# Patient Record
Sex: FEMALE | Race: WHITE | Employment: OTHER | URBAN - METROPOLITAN AREA
[De-identification: names, ages, dates, MRNs, and addresses within clinical notes are randomized per-mention and may not be internally consistent; named-entity substitution may affect disease eponyms.]

---

## 2019-06-10 ENCOUNTER — DOCUMENTATION ONLY (OUTPATIENT)
Dept: OTHER | Facility: CLINIC | Age: 84
End: 2019-06-10

## 2019-06-18 ENCOUNTER — ASSISTED LIVING VISIT (OUTPATIENT)
Dept: GERIATRICS | Facility: CLINIC | Age: 84
End: 2019-06-18
Payer: COMMERCIAL

## 2019-06-18 VITALS
BODY MASS INDEX: 19.93 KG/M2 | WEIGHT: 124 LBS | SYSTOLIC BLOOD PRESSURE: 100 MMHG | DIASTOLIC BLOOD PRESSURE: 60 MMHG | TEMPERATURE: 97.3 F | RESPIRATION RATE: 20 BRPM | OXYGEN SATURATION: 98 % | HEIGHT: 66 IN | HEART RATE: 68 BPM

## 2019-06-18 DIAGNOSIS — F32.1 CURRENT MODERATE EPISODE OF MAJOR DEPRESSIVE DISORDER, UNSPECIFIED WHETHER RECURRENT (H): ICD-10-CM

## 2019-06-18 DIAGNOSIS — R52 PAIN: ICD-10-CM

## 2019-06-18 DIAGNOSIS — I69.254: Primary | ICD-10-CM

## 2019-06-18 DIAGNOSIS — Z71.89 ACP (ADVANCE CARE PLANNING): ICD-10-CM

## 2019-06-18 DIAGNOSIS — K21.9 GASTROESOPHAGEAL REFLUX DISEASE, ESOPHAGITIS PRESENCE NOT SPECIFIED: ICD-10-CM
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
## 2019-06-18 DIAGNOSIS — N81.10 FEMALE BLADDER PROLAPSE: ICD-10-CM

## 2019-06-18 DIAGNOSIS — R13.10 DYSPHAGIA, UNSPECIFIED TYPE: ICD-10-CM

## 2019-06-18 DIAGNOSIS — G47.00 INSOMNIA, UNSPECIFIED TYPE: ICD-10-CM

## 2019-06-18 DIAGNOSIS — M15.9 OSTEOARTHRITIS OF MULTIPLE JOINTS, UNSPECIFIED OSTEOARTHRITIS TYPE: ICD-10-CM

## 2019-06-18 DIAGNOSIS — I10 ESSENTIAL HYPERTENSION: ICD-10-CM

## 2019-06-18 DIAGNOSIS — F03.90 DEMENTIA WITHOUT BEHAVIORAL DISTURBANCE, UNSPECIFIED DEMENTIA TYPE: ICD-10-CM

## 2019-06-18 DIAGNOSIS — N39.0 RECURRENT UTI: ICD-10-CM

## 2019-06-18 RX ORDER — GABAPENTIN 100 MG/1
100 CAPSULE ORAL 3 TIMES DAILY
COMMUNITY

## 2019-06-18 RX ORDER — HYDROCODONE BITARTRATE AND ACETAMINOPHEN 5; 325 MG/1; MG/1
1 TABLET ORAL EVERY 12 HOURS PRN
COMMUNITY
End: 2019-06-18

## 2019-06-18 RX ORDER — AMLODIPINE BESYLATE 5 MG/1
5 TABLET ORAL DAILY
Status: ON HOLD | COMMUNITY
End: 2020-06-20

## 2019-06-18 RX ORDER — ACETAMINOPHEN 325 MG/1
650 TABLET ORAL EVERY 6 HOURS PRN
Status: ON HOLD | COMMUNITY
End: 2019-08-04

## 2019-06-18 RX ORDER — METOPROLOL TARTRATE 25 MG/1
25 TABLET, FILM COATED ORAL 2 TIMES DAILY
COMMUNITY
End: 2019-06-28

## 2019-06-18 RX ORDER — FAMOTIDINE 20 MG/1
20 TABLET, FILM COATED ORAL DAILY
COMMUNITY
End: 2019-06-28

## 2019-06-18 RX ORDER — CLONIDINE HYDROCHLORIDE 0.1 MG/1
0.1 TABLET ORAL 2 TIMES DAILY
Status: ON HOLD | COMMUNITY
End: 2020-06-20

## 2019-06-18 RX ORDER — LANOLIN ALCOHOL/MO/W.PET/CERES
6 CREAM (GRAM) TOPICAL AT BEDTIME
COMMUNITY
End: 2019-07-28

## 2019-06-18 RX ORDER — BACLOFEN 10 MG/1
10 TABLET ORAL AT BEDTIME
COMMUNITY
End: 2019-07-28

## 2019-06-18 SDOH — HEALTH STABILITY: MENTAL HEALTH: HOW OFTEN DO YOU HAVE A DRINK CONTAINING ALCOHOL?: NEVER

## 2019-06-18 ASSESSMENT — MIFFLIN-ST. JEOR: SCORE: 1009.21

## 2019-06-18 NOTE — LETTER
6/18/2019        RE: Vanda Banks  [facility]  [address] [address] [facility] [address]        [facility]  PRIMARY CARE PROVIDER AND CLINIC:  ONEL Atkinson [facility], [address] / [facility] [address]  Chief Complaint   Patient presents with     Establish Care     [place] Medical Record Number:  [id number]  Place of Service where encounter took place:  [facility] [[id number]]    Vanda Banks  is a 88 year old  (10/14/1930), admitted to the above facility from [facility] facility..  Admitted to this facility for  medical management and nursing care.    HPI:    HPI information obtained from: facility chart records, facility staff, patient report and family/first contact daughter, June report.     Patient is an 88 year old female with PMH significant for intraparenchymal hemorrhage (per daughter r/t severe htn) 9/8/18 resulting in left-sided hemiplegia, htn, GERD, OA, depression, dementia, and insomnia    Patient is new to provider, previously lived in [place].  Per patient's daughter, after her stroke this past fall, her  left her and family moved patient up to [place] to be close to one of her daughters.  Patient has 2 daughters, one is a nurse practitioner at [employer], and the other lives in [place].  Much of the history is provided by daughter, as I do not have records from out of state viewable to me.    Patient seen in her apartment, she reports overall doing ok other then she feels sad.  She tells me she wants to die, but denies any thoughts to harm herself or plans to carry it out, just ready to go at any time.  She misses her home, her daughter sold her home of many years this past week.  She is hopeful that she will make friends that want to play cards with her, she especially enjoys playing bridge and shows me a picture of a group of her friends at her previous residence.    Patient had a fall yesterday while attempting to  transfer herself, she denies dizziness or lightheadedness at time of the fall, but states she lost her footing.  She denies any pain since the fall or changes in ROM.  She is discouraged that she has primarily been in a w/c since her stroke, but tells me she made good gains with therapy while in [place] and was walking some with therapy prior to her discharge.  Has not worked with therapy for over a month.  She denies pain today, has prn norco available but has not needed.  Reports she struggled with a lot of pain to her left side post stroke, but it has improved    She has had difficulty with sleeping on/off and was taking melatonin with good effect, would like that to be scheduled.  She also tells me that she has spasms to her left side that primarily occur at bedtime.  She has prn baclofen, and daughter requests that be scheduled.  Also with gabapentin to help with pain    Daughter requests for referral to gynecology for prolapsed bladder.  Interested in being fit for pessary.  Per daughter, patient has struggled with frequent UTI's r/t to this and was last treated for one just prior to moving up to [place].  Patient denies dysuria, hematuria.  She reports urinary frequency but states unchanged from her baseline as she always needs to urinate frequently.  She is afebrile and denies n/v.  Poor fluid intake at baseline with concentrated urine    Denies/no reports of difficulty swallowing with eating or drinking.  She is now on a regular diet but had dysphagia post stroke and was on mechanical soft diet.      We discussed goals of care and POLST.  Patient makes it clear she wishes to be DNR/DNI, comfort care, would want to avoid hospitalization.  Her daughter, June, is her healthcare agent, and is in agreement with this     CODE STATUS/ADVANCE DIRECTIVES DISCUSSION:   DNR / DNI  Patient's living condition: lives in an assisted living facility  ALLERGIES: Azithromycin; Fenofibrate; Gemfibrozil; Levaquin [levofloxacin];  Penicillin g; Rosuvastatin; Vytorin; Bextra [valdecoxib]; Diltiazem; Hydrochlorothiazide; Sulfa antibiotics [sulfa drugs]; and Verapamil  PAST MEDICAL HISTORY:  has a past medical history of Cerebral infarction (H), Chronic osteoarthritis, Depressive disorder, and Hypertension.  PAST SURGICAL HISTORY:   has a past surgical history that includes back surgery.  FAMILY HISTORY: family history includes Cerebrovascular Disease in her father; Colon Cancer in her mother; Coronary Artery Disease in her brother and brother; Hypertension in her brother and brother.  SOCIAL HISTORY:   reports that she has never smoked. She has never used smokeless tobacco. She reports that she does not drink alcohol or use drugs.    Post Discharge Medication Reconciliation Status: patient was not discharged from an inpatient facility    Current Outpatient Medications   Medication Sig Dispense Refill     acetaminophen (TYLENOL) 325 MG tablet Take 650 mg by mouth every 6 hours as needed for mild pain       amLODIPine (NORVASC) 5 MG tablet Take 5 mg by mouth daily       baclofen (LIORESAL) 10 MG tablet Take 10 mg by mouth At Bedtime        cloNIDine (CATAPRES) 0.1 MG tablet Take 0.1 mg by mouth 2 times daily       famotidine (PEPCID) 20 MG tablet Take 20 mg by mouth daily       gabapentin (NEURONTIN) 100 MG capsule Take 100 mg by mouth 2 times daily       melatonin 3 MG tablet Take 6 mg by mouth At Bedtime        metoprolol tartrate (LOPRESSOR) 25 MG tablet Take 25 mg by mouth 2 times daily       sertraline (ZOLOFT) 50 MG tablet Take 50 mg by mouth daily       HYDROcodone-acetaminophen (NORCO) 5-325 MG tablet Take 1 tablet by mouth every 12 hours as needed for severe pain         ROS:  10 point ROS of systems including Constitutional, Eyes, Respiratory, Cardiovascular, Gastroenterology, Genitourinary, Integumentary, Musculoskeletal, Psychiatric were all negative except for pertinent positives noted in my HPI.    Vitals:  /60   Pulse 68    Temp 97.3  F (36.3  C)   Resp 20   Exam:  GENERAL APPEARANCE:  Alert, in no distress, cooperative  ENT:  Mouth and posterior oropharynx normal, moist mucous membranes, impaired hearing to left ear  EYES:  EOM, conjunctivae, lids, pupils and irises normal, PERRL  NECK:  No adenopathy,masses or thyromegaly  RESP:  respiratory effort and palpation of chest normal, lungs clear to auscultation , no respiratory distress  CV:  Palpation and auscultation of heart done , regular rate and rhythm, no murmur, rub, or gallop, no edema  ABDOMEN:  normal bowel sounds, soft, nontender, no hepatosplenomegaly or other masses  M/S:   Gait and station abnormal generalized weakness L>R, ROM to left upper extremitity reduced, no edema/erythema joints, AFO to left LE  SKIN:  no noted rashes or wounds  NEURO:   left hemiplegia, left hand/wrist slightly contracted, speech clear, no tremor  PSYCH:  oriented X 3, insight and judgement impaired, memory impaired , sad    Lab/Diagnostic data:  No labs available for review    ASSESSMENT/PLAN:  (I69.254) Hemiplegia of left nondominant side as late effect of other nontraumatic intracranial hemorrhage, unspecified hemiplegia type (H)  (primary encounter diagnosis)  Comment:   -intraparenchymal hemorrhage 9/8/18, per daughter r/t severe htn  -left hemiplegia, primarily w/c bound, would like to improve mobility and motivated   -per history was on anticonvulsants for period of time to prevent seizure, no seizures noted, no longer on anticonvulsant  -history left sided pain, improved  Plan: home health therapy for eval and treat  -staff assist with cares  -schedule baclofen at bedtime per family request for spasms at bedtime  -gabapentin for pain  -d'c prn norco     (F32.1) Current moderate episode of major depressive disorder, unspecified whether recurrent (H)  Comment: per history after CVA, was started on Zoloft after stroke per daughter  -still feeling quite depressed today, situational components  with loss of independence, home,  left her, move to [place]  -discussed med changes today with daughter, prefer to watch and wait today   Plan: continue zoloft  -monitor mood, home health will be involved, consider med changes in 1-2 months if not improvement    (I10) Essential hypertension  Comment: per history, per daughter contributory to CVA  -BP soft today given age and goals of care  -daughter would prefer to leave cardiac meds for now and monitor, appears was difficult to manage prior to her stroke  Plan: continue metoprolol, clonidine, and norvasc  -daughter will monitor BP   -CBC and BMP next week: no lab work on file    (M15.9) Osteoarthritis of multiple joints, unspecified osteoarthritis type  (R52) Pain  Comment: per history, per daughter history of severe low back pain, previously managed by exercises, patient denies today  -daughter also reports bone-on-bone to right hip   Plan: prn APAP  -encouraged wellness class for mobility  -therapy eval   -as above, d'c prn norco    (K21.9) Gastroesophageal reflux disease, esophagitis presence not specified  Comment: per history but without symptoms today  Plan: continue famotidine for now and monitor       (F03.90) Dementia without behavioral disturbance, unspecified dementia type  Comment: per history, since CVA  -orientated X3 but difficult with recall, no behaviors  -no cognitive testing available  Plan: home health therapy for cognitive testing  -AL staff dispense meds and meals  -supportive daughter     (R13.10) Dysphagia, unspecified type  Comment: per history s/p CVA  -per records was on mechanical soft diet, now on regular, denies issues currently  Plan: speech eval with home health     (G47.00) Insomnia, unspecified type  Comment: per history  -has responded well to melatonin per daughter, requests scheduled  Plan: schedule melatonin  -staff update with concerns    (N81.10) Female bladder prolapse  Comment: per history   Plan: OB/GYN REFERRAL           (W19.XXXA) Fall, initial encounter  Comment: noted yesterday, no injury noted  Plan: home health therapy eval  -staff assist with transfers  -fall protocol per facility     (N39.0) Recurrent UTI  Comment: per daughter, r/t prolapsed bladder  -was treated for UTI prior to leaving [place]  -currently without symptoms today  -not drinking enough fluids  Plan: monitor for symptoms, would not recommend retesting if asymptomatic, likely chronically colonized  -push fluids     (Z71.89) ACP (advance care planning)  Comment: discussed with patient and daughter (healthcare agent)  Plan: POLST completed, DNR/DNI, comfort focus       Total time with an established patient visit in the assisted livin minutes including discussions about the POC and care coordination with the patient and family, daughter, . Greater than 50% of total time spent with counseling and coordinating care     Electronically signed by:  ONEL Atkinson CNP                   Sincerely,        ONEL Atkinson CNP

## 2019-06-18 NOTE — PROGRESS NOTES
[facility] [facility]  PRIMARY CARE PROVIDER AND CLINIC:  ONEL Atkinson CNP, [address] / [facility] [address]  Chief Complaint   Patient presents with     [facility]     [place] Medical Record Number:  [id number]  Place of Service where encounter took place:  [facility] [[id number]]    Vanda Banks  is a 88 year old  (10/14/1930), admitted to the above facility from [facility] facility..  Admitted to this facility for  medical management and nursing care.    HPI:    HPI information obtained from: facility chart records, facility staff, patient report and family/first contact daughter, June report.     Patient is an 88 year old female with PMH significant for intraparenchymal hemorrhage (per daughter r/t severe htn) 9/8/18 resulting in left-sided hemiplegia, htn, GERD, OA, depression, dementia, and insomnia    Patient is new to [facility], previously lived in [place].  Per patient's daughter, after her stroke this past fall, her  left her and family moved patient up to [place] to be close to one of her daughters.  Patient has 2 daughters, one is a nurse practitioner at [employer], and the other lives in [place].  Much of the history is provided by daughter, as I do not have records from out of state viewable to me.    Patient seen in her apartment, she reports overall doing ok other then she feels sad.  She tells me she wants to die, but denies any thoughts to harm herself or plans to carry it out, just ready to go at any time.  She misses her home, her daughter sold her home of many years this past week.  She is hopeful that she will make friends that want to play cards with her, she especially enjoys playing bridge and shows me a picture of a group of her friends at her previous residence.    Patient had a fall yesterday while attempting to transfer herself, she denies dizziness or lightheadedness at time of the fall, but states she lost her  footing.  She denies any pain since the fall or changes in ROM.  She is discouraged that she has primarily been in a w/c since her stroke, but tells me she made good gains with therapy while in [place] and was walking some with therapy prior to her discharge.  Has not worked with therapy for over a month.  She denies pain today, has prn norco available but has not needed.  Reports she struggled with a lot of pain to her left side post stroke, but it has improved    She has had difficulty with sleeping on/off and was taking melatonin with good effect, would like that to be scheduled.  She also tells me that she has spasms to her left side that primarily occur at bedtime.  She has prn baclofen, and daughter requests that be scheduled.  Also with gabapentin to help with pain    Daughter requests for referral to gynecology for prolapsed bladder.  Interested in being fit for pessary.  Per daughter, patient has struggled with frequent UTI's r/t to this and was last treated for one just prior to moving up to [place].  Patient denies dysuria, hematuria.  She reports urinary frequency but states unchanged from her baseline as she always needs to urinate frequently.  She is afebrile and denies n/v.  Poor fluid intake at baseline with concentrated urine    Denies/no reports of difficulty swallowing with eating or drinking.  She is now on a regular diet but had dysphagia post stroke and was on mechanical soft diet.      We discussed goals of care and POLST.  Patient makes it clear she wishes to be DNR/DNI, comfort care, would want to avoid hospitalization.  Her daughter, Dafne, is her healthcare agent, and is in agreement with this     CODE STATUS/ADVANCE DIRECTIVES DISCUSSION:   DNR / DNI  Patient's living condition: lives in an assisted living facility  ALLERGIES: Azithromycin; Fenofibrate; Gemfibrozil; Levaquin [levofloxacin]; Penicillin g; Rosuvastatin; Vytorin; Bextra [valdecoxib]; Diltiazem; Hydrochlorothiazide; Sulfa antibiotics  "[sulfa drugs]; and Verapamil  PAST MEDICAL HISTORY:  has a past medical history of Cerebral infarction (H), Chronic osteoarthritis, Depressive disorder, and Hypertension.  PAST SURGICAL HISTORY:   has a past surgical history that includes back surgery and EP Ablation SVT.  FAMILY HISTORY: family history includes Cerebrovascular Disease in her father; Colon Cancer in her mother; Coronary Artery Disease in her brother and brother; Hypertension in her brother and brother.  SOCIAL HISTORY:   reports that she has never smoked. She has never used smokeless tobacco. She reports that she does not drink alcohol or use drugs.    Post Discharge Medication Reconciliation Status: patient was not discharged from an inpatient facility    Current Outpatient Medications   Medication Sig Dispense Refill     acetaminophen (TYLENOL) 325 MG tablet Take 650 mg by mouth every 6 hours as needed for mild pain       amLODIPine (NORVASC) 5 MG tablet Take 5 mg by mouth daily       baclofen (LIORESAL) 10 MG tablet Take 10 mg by mouth At Bedtime        cloNIDine (CATAPRES) 0.1 MG tablet Take 0.1 mg by mouth 2 times daily       famotidine (PEPCID) 20 MG tablet Take 20 mg by mouth daily       gabapentin (NEURONTIN) 100 MG capsule Take 100 mg by mouth 2 times daily       melatonin 3 MG tablet Take 6 mg by mouth At Bedtime        metoprolol tartrate (LOPRESSOR) 25 MG tablet Take 25 mg by mouth 2 times daily       sertraline (ZOLOFT) 50 MG tablet Take 50 mg by mouth daily         ROS:  10 point ROS of systems including Constitutional, Eyes, Respiratory, Cardiovascular, Gastroenterology, Genitourinary, Integumentary, Musculoskeletal, Psychiatric were all negative except for pertinent positives noted in my HPI.    Vitals:  /60   Pulse 68   Temp 97.3  F (36.3  C)   Resp 20   Ht 1.676 m (5' 6\")   Wt 56.2 kg (124 lb)   SpO2 98%   BMI 20.01 kg/m    Exam:  GENERAL APPEARANCE:  Alert, in no distress, cooperative  ENT:  Mouth and posterior " oropharynx normal, moist mucous membranes, impaired hearing to left ear  EYES:  EOM, conjunctivae, lids, pupils and irises normal, PERRL  NECK:  No adenopathy,masses or thyromegaly  RESP:  respiratory effort and palpation of chest normal, lungs clear to auscultation , no respiratory distress  CV:  Palpation and auscultation of heart done , regular rate and rhythm, no murmur, rub, or gallop, no edema  ABDOMEN:  normal bowel sounds, soft, nontender, no hepatosplenomegaly or other masses  M/S:   Gait and station abnormal generalized weakness L>R, ROM to left upper extremitity reduced, no edema/erythema joints, AFO to left LE  SKIN:  no noted rashes or wounds  NEURO:   left hemiplegia, left hand/wrist slightly contracted, speech clear, no tremor  PSYCH:  oriented X 3, insight and judgement impaired, memory impaired , sad    Lab/Diagnostic data:  No labs available for review    ASSESSMENT/PLAN:  (I69.254) Hemiplegia of left nondominant side as late effect of other nontraumatic intracranial hemorrhage, unspecified hemiplegia type (H)  (primary encounter diagnosis)  Comment:   -intraparenchymal hemorrhage 9/8/18, per daughter r/t severe htn  -left hemiplegia, primarily w/c bound, would like to improve mobility and motivated   -per history was on anticonvulsants for period of time to prevent seizure, no seizures noted, no longer on anticonvulsant  -history left sided pain, improved  Plan: home health therapy for eval and treat  -staff assist with cares  -schedule baclofen at bedtime per family request for spasms at bedtime  -gabapentin for pain  -d'c prn norco     (F32.1) Current moderate episode of major depressive disorder, unspecified whether recurrent (H)  Comment: per history after CVA, was started on Zoloft after stroke per daughter  -still feeling quite depressed today, situational components with loss of independence, home,  left her, move to [place]  -discussed med changes today with daughter, prefer to watch and  wait today   Plan: continue zoloft  -monitor mood, home health will be involved, consider med changes in 1-2 months if not improvement    (I10) Essential hypertension  Comment: per history, per daughter contributory to CVA  -BP soft today given age and goals of care  -daughter would prefer to leave cardiac meds for now and monitor, appears was difficult to manage prior to her stroke  Plan: continue metoprolol, clonidine, and norvasc  -daughter will monitor BP   -CBC and BMP next week: no lab work on file    (M15.9) Osteoarthritis of multiple joints, unspecified osteoarthritis type  (R52) Pain  Comment: per history, per daughter history of severe low back pain, previously managed by exercises, patient denies today  -daughter also reports bone-on-bone to right hip   Plan: prn APAP  -encouraged wellness class for mobility  -therapy eval   -as above, d'c prn norco    (K21.9) Gastroesophageal reflux disease, esophagitis presence not specified  Comment: per history but without symptoms today  Plan: continue famotidine for now and monitor       (F03.90) Dementia without behavioral disturbance, unspecified dementia type  Comment: per history, since CVA  -orientated X3 but difficult with recall, no behaviors  -no cognitive testing available  Plan: home health therapy for cognitive testing  -AL staff dispense meds and meals  -supportive daughter     (R13.10) Dysphagia, unspecified type  Comment: per history s/p CVA  -per records was on mechanical soft diet, now on regular, denies issues currently  Plan: speech eval with home health     (G47.00) Insomnia, unspecified type  Comment: per history  -has responded well to melatonin per daughter, requests scheduled  Plan: schedule melatonin  -staff update with concerns    (N81.10) Female bladder prolapse  Comment: per history   Plan: OB/GYN REFERRAL          (W19.XXXA) Fall, initial encounter  Comment: noted yesterday, no injury noted  Plan: home health therapy eval  -staff assist  with transfers  -fall protocol per facility     (N39.0) Recurrent UTI  Comment: per daughter, r/t prolapsed bladder  -was treated for UTI prior to leaving [place]  -currently without symptoms today  -not drinking enough fluids  Plan: monitor for symptoms, would not recommend retesting if asymptomatic, likely chronically colonized  -push fluids     (Z71.89) ACP (advance care planning)  Comment: discussed with patient and daughter (healthcare agent)  Plan: POLST completed, DNR/DNI, comfort focus       Total time with an established patient visit in the assisted livin minutes including discussions about the POC and care coordination with the patient and family, daughter, . Greater than 50% of total time spent with counseling and coordinating care     Electronically signed by:  ONEL Atkinson CNP

## 2019-06-21 ENCOUNTER — DOCUMENTATION ONLY (OUTPATIENT)
Dept: GERIATRICS | Facility: CLINIC | Age: 84
End: 2019-06-21

## 2019-06-21 NOTE — PROGRESS NOTES
[facility] now requests orders and shares plan of care/discharge summaries for some patients through [employer].  Please REPLY TO THIS MESSAGE OR ROUTE BACK TO THE AUTHOR in order to give authorization for orders when needed.  This is considered a verbal order, you will still receive a faxed copy of orders for signature.  Thank you for your assistance in improving collaboration for our patients.    ORDER  OT POC for 2w4 for UE and [facility] HEP, ADL/IADLs safety assess and educate for increased Independece.

## 2019-06-25 ENCOUNTER — TRANSFERRED RECORDS (OUTPATIENT)
Dept: HEALTH INFORMATION MANAGEMENT | Facility: CLINIC | Age: 84
End: 2019-06-25

## 2019-06-25 LAB
ANION GAP SERPL CALCULATED.3IONS-SCNC: 6 MMOL/L (ref 3–14)
BUN SERPL-MCNC: 22 MG/DL (ref 7–30)
CALCIUM SERPL-MCNC: 9.1 MG/DL (ref 8.5–10.1)
CHLORIDE SERPLBLD-SCNC: 107 MMOL/L (ref 94–109)
CO2 SERPL-SCNC: 27 MMOL/L (ref 20–32)
CREAT SERPL-MCNC: 0.98 MG/DL (ref 0.52–1.04)
ERYTHROCYTE [DISTWIDTH] IN BLOOD BY AUTOMATED COUNT: 13.8 % (ref 10–15)
GFR SERPL CREATININE-BSD FRML MDRD: 52 ML/MIN/1.73M2
GLUCOSE SERPL-MCNC: 77 MG/DL (ref 70–99)
HCT VFR BLD AUTO: 41.3 % (ref 35–47)
HEMOGLOBIN: 13.8 G/DL (ref 11.7–15.7)
MCH RBC QN AUTO: 28.9 PG (ref 26.5–33)
MCHC RBC AUTO-ENTMCNC: 33.4 G/DL (ref 31.5–36.5)
MCV RBC AUTO: 86 FL (ref 78–100)
PLATELET # BLD AUTO: 306 10E9/L (ref 150–450)
POTASSIUM SERPL-SCNC: 3.7 MMOL/L (ref 3.4–5.3)
RBC # BLD AUTO: 4.78 10E12/L (ref 3.8–5.2)
SODIUM SERPL-SCNC: 140 MMOL/L (ref 133–144)
WBC # BLD AUTO: 10.5 10E9/L (ref 4–11)

## 2019-08-01 ENCOUNTER — APPOINTMENT (OUTPATIENT)
Dept: CT IMAGING | Facility: CLINIC | Age: 84
DRG: 552 | End: 2019-08-01
Attending: NURSE PRACTITIONER
Payer: MEDICARE

## 2019-08-01 ENCOUNTER — HOSPITAL ENCOUNTER (INPATIENT)
Facility: CLINIC | Age: 84
LOS: 2 days | Discharge: NURSING, REHAB OR LONG-TERM CARE | DRG: 552 | End: 2019-08-04
Attending: NURSE PRACTITIONER | Admitting: INTERNAL MEDICINE
Payer: MEDICARE

## 2019-08-01 DIAGNOSIS — S32.059A CLOSED FRACTURE OF FIFTH LUMBAR VERTEBRA, UNSPECIFIED FRACTURE MORPHOLOGY, INITIAL ENCOUNTER (H): Primary | ICD-10-CM

## 2019-08-01 DIAGNOSIS — M25.551 RIGHT HIP PAIN: ICD-10-CM

## 2019-08-01 DIAGNOSIS — M25.551 HIP PAIN, RIGHT: ICD-10-CM

## 2019-08-01 LAB
ANION GAP SERPL CALCULATED.3IONS-SCNC: 5 MMOL/L (ref 3–14)
BUN SERPL-MCNC: 34 MG/DL (ref 7–30)
CALCIUM SERPL-MCNC: 8.3 MG/DL (ref 8.5–10.1)
CHLORIDE SERPL-SCNC: 110 MMOL/L (ref 94–109)
CO2 SERPL-SCNC: 24 MMOL/L (ref 20–32)
CREAT SERPL-MCNC: 0.92 MG/DL (ref 0.52–1.04)
ERYTHROCYTE [DISTWIDTH] IN BLOOD BY AUTOMATED COUNT: 13.4 % (ref 10–15)
GFR SERPL CREATININE-BSD FRML MDRD: 55 ML/MIN/{1.73_M2}
GLUCOSE SERPL-MCNC: 119 MG/DL (ref 70–99)
HCT VFR BLD AUTO: 43.2 % (ref 35–47)
HGB BLD-MCNC: 13.8 G/DL (ref 11.7–15.7)
MCH RBC QN AUTO: 28.1 PG (ref 26.5–33)
MCHC RBC AUTO-ENTMCNC: 31.9 G/DL (ref 31.5–36.5)
MCV RBC AUTO: 88 FL (ref 78–100)
PLATELET # BLD AUTO: 276 10E9/L (ref 150–450)
POTASSIUM SERPL-SCNC: 3.9 MMOL/L (ref 3.4–5.3)
RBC # BLD AUTO: 4.91 10E12/L (ref 3.8–5.2)
SODIUM SERPL-SCNC: 139 MMOL/L (ref 133–144)
WBC # BLD AUTO: 13.3 10E9/L (ref 4–11)

## 2019-08-01 PROCEDURE — G0378 HOSPITAL OBSERVATION PER HR: HCPCS

## 2019-08-01 PROCEDURE — 80048 BASIC METABOLIC PNL TOTAL CA: CPT | Performed by: INTERNAL MEDICINE

## 2019-08-01 PROCEDURE — 99219 ZZC INITIAL OBSERVATION CARE,LEVL II: CPT | Performed by: INTERNAL MEDICINE

## 2019-08-01 PROCEDURE — 99285 EMERGENCY DEPT VISIT HI MDM: CPT | Mod: 25

## 2019-08-01 PROCEDURE — 25000132 ZZH RX MED GY IP 250 OP 250 PS 637: Mod: GY | Performed by: INTERNAL MEDICINE

## 2019-08-01 PROCEDURE — 85027 COMPLETE CBC AUTOMATED: CPT | Performed by: INTERNAL MEDICINE

## 2019-08-01 PROCEDURE — 73700 CT LOWER EXTREMITY W/O DYE: CPT | Mod: RT

## 2019-08-01 PROCEDURE — 25000132 ZZH RX MED GY IP 250 OP 250 PS 637: Mod: GY | Performed by: NURSE PRACTITIONER

## 2019-08-01 PROCEDURE — 36415 COLL VENOUS BLD VENIPUNCTURE: CPT | Performed by: INTERNAL MEDICINE

## 2019-08-01 RX ORDER — BISACODYL 10 MG
10 SUPPOSITORY, RECTAL RECTAL DAILY PRN
Status: DISCONTINUED | OUTPATIENT
Start: 2019-08-01 | End: 2019-08-04 | Stop reason: HOSPADM

## 2019-08-01 RX ORDER — GABAPENTIN 100 MG/1
200 CAPSULE ORAL 3 TIMES DAILY
Status: DISCONTINUED | OUTPATIENT
Start: 2019-08-01 | End: 2019-08-04 | Stop reason: HOSPADM

## 2019-08-01 RX ORDER — ONDANSETRON 4 MG/1
4 TABLET, ORALLY DISINTEGRATING ORAL EVERY 6 HOURS PRN
Status: DISCONTINUED | OUTPATIENT
Start: 2019-08-01 | End: 2019-08-04 | Stop reason: HOSPADM

## 2019-08-01 RX ORDER — LIDOCAINE 4 G/G
1 PATCH TOPICAL
Status: DISCONTINUED | OUTPATIENT
Start: 2019-08-02 | End: 2019-08-04 | Stop reason: HOSPADM

## 2019-08-01 RX ORDER — ONDANSETRON 2 MG/ML
4 INJECTION INTRAMUSCULAR; INTRAVENOUS EVERY 6 HOURS PRN
Status: DISCONTINUED | OUTPATIENT
Start: 2019-08-01 | End: 2019-08-04 | Stop reason: HOSPADM

## 2019-08-01 RX ORDER — LANOLIN ALCOHOL/MO/W.PET/CERES
6 CREAM (GRAM) TOPICAL AT BEDTIME
Status: DISCONTINUED | OUTPATIENT
Start: 2019-08-01 | End: 2019-08-04 | Stop reason: HOSPADM

## 2019-08-01 RX ORDER — PROCHLORPERAZINE 25 MG
12.5 SUPPOSITORY, RECTAL RECTAL EVERY 12 HOURS PRN
Status: DISCONTINUED | OUTPATIENT
Start: 2019-08-01 | End: 2019-08-04 | Stop reason: HOSPADM

## 2019-08-01 RX ORDER — AMOXICILLIN 250 MG
1 CAPSULE ORAL 2 TIMES DAILY PRN
Status: DISCONTINUED | OUTPATIENT
Start: 2019-08-01 | End: 2019-08-04 | Stop reason: HOSPADM

## 2019-08-01 RX ORDER — BACLOFEN 10 MG/1
10 TABLET ORAL AT BEDTIME
Status: DISCONTINUED | OUTPATIENT
Start: 2019-08-01 | End: 2019-08-04 | Stop reason: HOSPADM

## 2019-08-01 RX ORDER — PROCHLORPERAZINE MALEATE 5 MG
5 TABLET ORAL EVERY 6 HOURS PRN
Status: DISCONTINUED | OUTPATIENT
Start: 2019-08-01 | End: 2019-08-04 | Stop reason: HOSPADM

## 2019-08-01 RX ORDER — CLONIDINE HYDROCHLORIDE 0.1 MG/1
0.1 TABLET ORAL 2 TIMES DAILY
Status: DISCONTINUED | OUTPATIENT
Start: 2019-08-01 | End: 2019-08-04 | Stop reason: HOSPADM

## 2019-08-01 RX ORDER — LIDOCAINE 4 G/G
1 PATCH TOPICAL ONCE
Status: COMPLETED | OUTPATIENT
Start: 2019-08-01 | End: 2019-08-02

## 2019-08-01 RX ORDER — METHOCARBAMOL 750 MG/1
750 TABLET, FILM COATED ORAL ONCE
Status: COMPLETED | OUTPATIENT
Start: 2019-08-01 | End: 2019-08-01

## 2019-08-01 RX ORDER — FAMOTIDINE 20 MG/1
20 TABLET, FILM COATED ORAL DAILY
Status: DISCONTINUED | OUTPATIENT
Start: 2019-08-01 | End: 2019-08-04 | Stop reason: HOSPADM

## 2019-08-01 RX ORDER — AMLODIPINE BESYLATE 5 MG/1
5 TABLET ORAL DAILY
Status: DISCONTINUED | OUTPATIENT
Start: 2019-08-02 | End: 2019-08-04 | Stop reason: HOSPADM

## 2019-08-01 RX ORDER — AMOXICILLIN 250 MG
2 CAPSULE ORAL 2 TIMES DAILY PRN
Status: DISCONTINUED | OUTPATIENT
Start: 2019-08-01 | End: 2019-08-04 | Stop reason: HOSPADM

## 2019-08-01 RX ORDER — METOPROLOL TARTRATE 25 MG/1
25 TABLET, FILM COATED ORAL 2 TIMES DAILY
Status: DISCONTINUED | OUTPATIENT
Start: 2019-08-01 | End: 2019-08-04 | Stop reason: HOSPADM

## 2019-08-01 RX ORDER — ACETAMINOPHEN 325 MG/1
975 TABLET ORAL 3 TIMES DAILY
Status: DISCONTINUED | OUTPATIENT
Start: 2019-08-01 | End: 2019-08-04 | Stop reason: HOSPADM

## 2019-08-01 RX ORDER — IBUPROFEN 400 MG/1
400 TABLET, FILM COATED ORAL EVERY 6 HOURS PRN
Status: DISCONTINUED | OUTPATIENT
Start: 2019-08-01 | End: 2019-08-02

## 2019-08-01 RX ORDER — NALOXONE HYDROCHLORIDE 0.4 MG/ML
.1-.4 INJECTION, SOLUTION INTRAMUSCULAR; INTRAVENOUS; SUBCUTANEOUS
Status: DISCONTINUED | OUTPATIENT
Start: 2019-08-01 | End: 2019-08-04 | Stop reason: HOSPADM

## 2019-08-01 RX ADMIN — MELATONIN TAB 3 MG 6 MG: 3 TAB at 22:34

## 2019-08-01 RX ADMIN — DICLOFENAC 2 G: 10 GEL TOPICAL at 22:35

## 2019-08-01 RX ADMIN — METOPROLOL TARTRATE 25 MG: 25 TABLET, FILM COATED ORAL at 19:57

## 2019-08-01 RX ADMIN — METHOCARBAMOL TABLETS 750 MG: 750 TABLET, COATED ORAL at 15:36

## 2019-08-01 RX ADMIN — GABAPENTIN 200 MG: 100 CAPSULE ORAL at 19:57

## 2019-08-01 RX ADMIN — LIDOCAINE 1 PATCH: 560 PATCH PERCUTANEOUS; TOPICAL; TRANSDERMAL at 15:37

## 2019-08-01 RX ADMIN — CLONIDINE HYDROCHLORIDE 0.1 MG: 0.1 TABLET ORAL at 19:57

## 2019-08-01 RX ADMIN — BACLOFEN 10 MG: 10 TABLET ORAL at 22:34

## 2019-08-01 RX ADMIN — FAMOTIDINE 20 MG: 20 TABLET ORAL at 22:34

## 2019-08-01 RX ADMIN — ACETAMINOPHEN 975 MG: 325 TABLET, FILM COATED ORAL at 19:57

## 2019-08-01 ASSESSMENT — ENCOUNTER SYMPTOMS
DIFFICULTY URINATING: 0
SHORTNESS OF BREATH: 0
NUMBNESS: 0
MYALGIAS: 1
HEADACHES: 0
FEVER: 0
WEAKNESS: 1
CONSTIPATION: 0
BACK PAIN: 1
ABDOMINAL PAIN: 0
CHILLS: 0

## 2019-08-01 ASSESSMENT — MIFFLIN-ST. JEOR: SCORE: 1002.32

## 2019-08-01 NOTE — ED NOTES
[facility]  ED Nurse Handoff Report    Vanda Banks is a 88 year old female   ED Chief complaint: fall, right hip/leg pain and weakness  . ED Diagnosis:   Final diagnoses:   Hip pain, right     Allergies:   Allergies   Allergen Reactions     Azithromycin Diarrhea     Fenofibrate      Gemfibrozil      Levaquin [Levofloxacin] GI Disturbance     Penicillin G      Rosuvastatin      Vytorin      Bextra [Valdecoxib] Rash     Diltiazem Rash     Hydrochlorothiazide Rash     Sulfa Antibiotics [Sulfa Drugs] Rash     Verapamil Rash       Code Status: DNR  Activity level - Baseline/Home:  Assist x 1. Activity Level - Current:   Total Care. Lift room needed: Yes. Bariatric: No   Needed: No   Isolation: No. Infection: Not Applicable.     Vital Signs:   Vitals:    08/01/19 1418 08/01/19 1630   BP: 113/73 131/81   Pulse: 76 75   Resp: 20    Temp: 99.2  F (37.3  C)    TempSrc: Temporal    SpO2: 96% 97%       Cardiac Rhythm:  ,      Pain level: 0-10 Pain Scale: 10  Patient confused: No. Patient Falls Risk: Yes.   Elimination Status: Has voided   Patient Report - Initial Complaint: Fall, Right hip pain. Focused Assessment: Patient fell around 7/23 and experienced significant right hip pain. Patient has been evaluated by [facility] and given prednisone without improvement. Here, patient able to roll in bed with assistance but unable to stand at bedside secondary to pain. Pulses strong. Patient has existing left sided deficit secondary to stroke.  Tests Performed: CT. Abnormal Results:   CT Hip Right w/o Contrast   Final Result   IMPRESSION: No evidence of acute fracture or subluxation. Moderate   bilateral hip degenerative changes.      LINDEN AMBROCIO MD        .   Treatments provided: 750 mg Robaxin, 1 lidocare patch  Family Comments: Daughter at bedside  OBS brochure/video discussed/provided to patient:  Yes  ED Medications:   Medications   Lidocaine (LIDOCARE) 4 % Patch 1 patch (1 patch Transdermal Given 8/1/19  1537)   methocarbamol (ROBAXIN) tablet 750 mg (750 mg Oral Given 8/1/19 1536)     Drips infusing:  No  For the majority of the shift, the patient's behavior Green. Interventions performed were N/A.     Severe Sepsis OR Septic Shock Diagnosis Present: No      ED Nurse Name/Phone Number: Obdulia Justin,   5:18 PM  RECEIVING UNIT ED HANDOFF REVIEW    Above ED Nurse Handoff Report was reviewed: Yes  Reviewed by: Mary Coleman on August 1, 2019 at 6:06 PM

## 2019-08-01 NOTE — ED TRIAGE NOTES
Fall 7/23, right hip/leg pain started on 7/25.  Took patient to UC at [facility], did prednisone pack without relief.  Went to [facility] for follow up appt today, told to come to ED.  Had lumbar surgery 50 years ago.  Not able to bear weight or ambulate at all.   ABCDs intact.

## 2019-08-01 NOTE — ED NOTES
RN and ERT in to change patient's depends.  Patient able to roll from side to side without pain.

## 2019-08-01 NOTE — ED PROVIDER NOTES
History     Chief Complaint:  fall, right hip/leg pain and weakness      HPI   Vanda Banks is a 88 year old female who presents with pain in the right hip that radiates down leg approximately 2 weeks ago and did not have any significant discomfort at that time approximately 2 days after how she started to develop discomfort.  She was seen at [facility] orthopedics after that fall and given a Medrol dose pack.  This did not seem to resolve her symptoms.  They did make an appointment with orthopedics today and upon presentation for the appointment today she was sent here for further evaluation due to continued discomfort.  She has not had any loss of bowel or bladder control.  She has had no numbness or tingling.  Describes a feeling of weakness in her right leg due to the pain.  She has had a difficult time walking and moving as a result.  She did have a negative x-ray done on the initial evaluation.  She has had no further imaging.  No other concerns or complaints at this time.    Allergies:  Azithromycin  Fenofibrate  Gemfibrozil  Levaquin [Levofloxacin]  Penicillin G  Rosuvastatin  Vytorin  Bextra [Valdecoxib]  Diltiazem  Hydrochlorothiazide  Sulfa Antibiotics [Sulfa Drugs]  Verapamil        Medications:      acetaminophen (TYLENOL) 325 MG tablet   amLODIPine (NORVASC) 5 MG tablet   baclofen (LIORESAL) 10 MG tablet   cloNIDine (CATAPRES) 0.1 MG tablet   famotidine (PEPCID) 20 MG tablet   gabapentin (NEURONTIN) 100 MG capsule   melatonin 3 MG tablet   metoprolol tartrate (LOPRESSOR) 25 MG tablet   sertraline (ZOLOFT) 50 MG tablet       Past Medical History:    Past Medical History:   Diagnosis Date     Cerebral infarction (H)      Chronic osteoarthritis      Depressive disorder      Hypertension        Past Surgical History:    Past Surgical History:   Procedure Laterality Date     BACK SURGERY       EP ABLATION SVT         Family History:    Family History   Problem Relation Age of Onset     Colon Cancer  Mother          in 70's     Cerebrovascular Disease Father          of stroke in 50's     Hypertension Brother      Coronary Artery Disease Brother      Hypertension Brother      Coronary Artery Disease Brother        Social History:  Marital Status:  Single [1]  Social History     Tobacco Use     Smoking status: Never Smoker     Smokeless tobacco: Never Used   Substance Use Topics     Alcohol use: Never     Frequency: Never     Drug use: Never        Review of Systems   Constitutional: Negative for chills and fever.   Respiratory: Negative for shortness of breath.    Cardiovascular: Negative for chest pain.   Gastrointestinal: Negative for abdominal pain and constipation.   Genitourinary: Negative for difficulty urinating.   Musculoskeletal: Positive for back pain and myalgias.   Skin: Positive for rash.   Neurological: Positive for weakness. Negative for numbness and headaches.   All other systems reviewed and are negative.        Physical Exam     Patient Vitals for the past 24 hrs:   BP Temp Temp src Pulse Resp SpO2   19 1630 131/81 -- -- 75 -- 97 %   19 1418 113/73 99.2  F (37.3  C) Temporal 76 20 96 %        Physical Exam  General: Alert, Mild  discomfort, well kept, elderly and frail.  Eyes: PERRL, conjunctivae pink no scleral icterus or conjunctival injection  ENT:   Moist mucus membranes, posterior oropharynx clear without erythema or exudates, No lymphadenopathy, Normal voice  Resp:  Lungs clear to auscultation bilaterally, no crackles/rubs/wheezes. Good air movement  CV:  Normal rate and rhythm, no murmurs/rubs/gallops  GI:  Abdomen soft and non-distended.  Normoactive BS.  No tenderness, guarding or rebound, No masses  Skin:  Warm, dry.  No rashes or petechiae  Musculoskeletal: Residual left-sided weakness from previous CVA.  Right hip tenderness and muscle spasming.  No lumbar spinal tenderness.  There is tenderness along the sciatic tract in the right buttock.  Neuro: Alert and  oriented to person/place/time, normal sensation  Psychiatric: Normal affect, cooperative, good eye contact    Emergency Department Course     Imaging:  Recent Results (from the past 24 hour(s))   CT Hip Right w/o Contrast    Narrative    CT RIGHT HIP WITHOUT CONTRAST  8/1/2019 4:10 PM     HISTORY: Right hip pain after fall 2 weeks ago.    TECHNIQUE: Axial scans were performed through the pelvis with sagittal  and coronal reconstruction. Radiation dose for this scan was reduced  using automated exposure control, adjustment of the mA and/or kV  according to patient size, or iterative reconstruction technique.    COMPARISON: None.    FINDINGS: Moderate bilateral hip degenerative changes, slightly  greater on the right than the left. No evidence of acute fracture or  subluxation. No evidence of sacral fracture. Pubic rami are intact. No  acetabular fracture. Degenerative changes in the lower lumbar spine.      Impression    IMPRESSION: No evidence of acute fracture or subluxation. Moderate  bilateral hip degenerative changes.    LINDEN AMBROCIO MD     Interventions:  Medications   Lidocaine (LIDOCARE) 4 % Patch 1 patch (1 patch Transdermal Given 8/1/19 1537)   methocarbamol (ROBAXIN) tablet 750 mg (750 mg Oral Given 8/1/19 1536)       Emergency Department Course:  Past medical records, nursing notes, and vitals reviewed.    1515: I performed an exam of the patient and obtained history, as documented above.     The patient was sent for a CT right hip while in the emergency department, findings above.     Patient unable to tolerate weightbearing despite stating she feels improved after the above treatment.    Due to patient's inability to bear weight and go about her usual activities plan will be to admit her for intractable back pain, possible PT OT evaluation.  I discussed the case with the hospitalist Dr. Mayorga did accept patient to their service..          Impression & Plan      Medical Decision Making:  Vanda Banks  is a 88 year old female who presents today for evaluation of right hip pain.  Patient fell approximately 2 weeks ago she did not have any initial pain at that time.  She developed pain 2 days later and was evaluated at [facility] orthopedic urgent care.  Hip x-ray was obtained with out indication for fracture, malalignment, or dislocation.  She was placed on a Medrol dose pack without significant relief of her symptoms.  Due to continued discomfort they did not schedule an appointment with orthopedics and today was seen by orthopedic NP who sent her here for further evaluation.  Her initial examination showed likely musculoskeletal source of discomfort in her right hip.  She did not have pain while examining her lumbar spine.  I did obtain a CT scan which showed no acute process.  However patient is unable to bear weight and go about her activities of daily living.  At this point time plan will be to admit her to the observation unit for pain management, possible PT OT evaluation possible orthopedic consult and advanced imagery if indicated.      Diagnosis:    ICD-10-CM    1. Hip pain, right M25.551        Disposition:  Admitted to Observation unit    Discharge Medications:     Medication List      There are no discharge medications for this visit.         Pop Anaya  8/1/2019   [facility] EMERGENCY DEPARTMENT  Scribe Disclosure:  Janene LANCASTER, am serving as a scribe at 4:14 PM on 8/1/2019 to document services personally performed by Pop Anaya APRN based on my observations and the provider's statements to me.       Pop Anaya APRN CNP  08/01/19 2625

## 2019-08-01 NOTE — PHARMACY-ADMISSION MEDICATION HISTORY
Admission medication history interview status for this patient is complete. See [facility] admission navigator for allergy information, prior to admission medications and immunization status.     Medication history interview source(s):Caregiver genna [phone]  Medication history resources (including written lists, pill bottles, clinic record):medlist    Changes made to PTA medication list:  Added: none  Deleted: none  Changed: none    Actions taken by pharmacist (provider contacted, etc):None     Additional medication history information:spoke with Genna at [facility]    Medication reconciliation/reorder completed by provider prior to medication history? No    For patients on insulin therapy: no (Yes/No)   Lantus/levemir/NPH/Mix 70/30 dose: ___ in AM/PM or twice daily   Sliding scale Novolog Y/N   If Yes, do you have a baseline novolog pre-meal dose: ______units with meals   Patients eat three meals a day: Y/N ---  How many episodes of hypoglycemia (low blood glucose) do you have weekly: ---   How many missed doses do you have a week: ---  How many times do you check your blood glucose per day: ---  Any Barriers to therapy: cost of medications/comfortable with giving injections (if applicable)/ comfortable and confident with current diabetes regimen ---      Prior to Admission medications    Medication Sig Last Dose Taking? Auth Provider   acetaminophen (TYLENOL) 325 MG tablet Take 650 mg by mouth every 6 hours as needed for mild pain  Yes Reported, Patient   amLODIPine (NORVASC) 5 MG tablet Take 5 mg by mouth daily 8/1/2019 at am Yes Reported, Patient   baclofen (LIORESAL) 10 MG tablet TAKE 1 TABLET BY MOUTH AT BEDTIME 7/31/2019 at Unknown time Yes Julee Jauregui APRN CNP   cloNIDine (CATAPRES) 0.1 MG tablet Take 0.1 mg by mouth 2 times daily 8/1/2019 at am Yes Reported, Patient   famotidine (PEPCID) 20 MG tablet TAKE 1 TABLET BY MOUTH ONCE DAILY 7/31/2019 at hs Yes Julee Jauregui APRN CNP    gabapentin (NEURONTIN) 100 MG capsule Take 100 mg by mouth 2 times daily 8/1/2019 at am Yes Reported, Patient   melatonin 3 MG tablet TAKE TWO TABLETS (6MG) BY MOUTH AT BEDTIME 7/31/2019 at Unknown time Yes Julee Jauregui APRN CNP   metoprolol tartrate (LOPRESSOR) 25 MG tablet TAKE 1 TABLET BY MOUTH TWICE DAILY 8/1/2019 at am Yes Julee Jauregui APRN CNP   sertraline (ZOLOFT) 50 MG tablet Take 50 mg by mouth daily 8/1/2019 at am Yes Reported, Patient

## 2019-08-02 ENCOUNTER — APPOINTMENT (OUTPATIENT)
Dept: MRI IMAGING | Facility: CLINIC | Age: 84
DRG: 552 | End: 2019-08-02
Attending: INTERNAL MEDICINE
Payer: MEDICARE

## 2019-08-02 ENCOUNTER — APPOINTMENT (OUTPATIENT)
Dept: CT IMAGING | Facility: CLINIC | Age: 84
DRG: 552 | End: 2019-08-02
Attending: PHYSICIAN ASSISTANT
Payer: MEDICARE

## 2019-08-02 ENCOUNTER — HOSPITAL ENCOUNTER (OUTPATIENT)
Age: 84
End: 2019-08-02
Attending: HOSPITALIST | Admitting: HOSPITALIST
Payer: MEDICARE

## 2019-08-02 PROBLEM — S32.009A: Status: ACTIVE | Noted: 2019-08-02

## 2019-08-02 PROCEDURE — G0378 HOSPITAL OBSERVATION PER HR: HCPCS

## 2019-08-02 PROCEDURE — 12000011 ZZH R&B MS OVERFLOW

## 2019-08-02 PROCEDURE — 25000132 ZZH RX MED GY IP 250 OP 250 PS 637: Mod: GY | Performed by: INTERNAL MEDICINE

## 2019-08-02 PROCEDURE — 72131 CT LUMBAR SPINE W/O DYE: CPT

## 2019-08-02 PROCEDURE — 99222 1ST HOSP IP/OBS MODERATE 55: CPT | Performed by: PHYSICIAN ASSISTANT

## 2019-08-02 PROCEDURE — 72148 MRI LUMBAR SPINE W/O DYE: CPT

## 2019-08-02 PROCEDURE — 99232 SBSQ HOSP IP/OBS MODERATE 35: CPT | Performed by: PHYSICIAN ASSISTANT

## 2019-08-02 PROCEDURE — L0637 LSO SC R ANT/POS PNL PRE CST: HCPCS

## 2019-08-02 RX ORDER — BISACODYL 5 MG
15 TABLET, DELAYED RELEASE (ENTERIC COATED) ORAL DAILY PRN
Status: CANCELLED | OUTPATIENT
Start: 2019-08-02

## 2019-08-02 RX ORDER — AMOXICILLIN 250 MG
1 CAPSULE ORAL 2 TIMES DAILY
Status: CANCELLED | OUTPATIENT
Start: 2019-08-02

## 2019-08-02 RX ORDER — ACETAMINOPHEN 500 MG
1000 TABLET ORAL EVERY 8 HOURS
Status: CANCELLED | OUTPATIENT
Start: 2019-08-02 | End: 2019-08-05

## 2019-08-02 RX ORDER — POLYETHYLENE GLYCOL 3350 17 G/17G
17 POWDER, FOR SOLUTION ORAL DAILY PRN
Status: CANCELLED | OUTPATIENT
Start: 2019-08-02

## 2019-08-02 RX ORDER — AMOXICILLIN 250 MG
2 CAPSULE ORAL 2 TIMES DAILY
Status: CANCELLED | OUTPATIENT
Start: 2019-08-02

## 2019-08-02 RX ORDER — BISACODYL 5 MG
5 TABLET, DELAYED RELEASE (ENTERIC COATED) ORAL DAILY PRN
Status: CANCELLED | OUTPATIENT
Start: 2019-08-02

## 2019-08-02 RX ORDER — BISACODYL 5 MG
10 TABLET, DELAYED RELEASE (ENTERIC COATED) ORAL DAILY PRN
Status: CANCELLED | OUTPATIENT
Start: 2019-08-02

## 2019-08-02 RX ADMIN — DICLOFENAC 2 G: 10 GEL TOPICAL at 19:48

## 2019-08-02 RX ADMIN — CLONIDINE HYDROCHLORIDE 0.1 MG: 0.1 TABLET ORAL at 08:20

## 2019-08-02 RX ADMIN — DICLOFENAC 2 G: 10 GEL TOPICAL at 16:18

## 2019-08-02 RX ADMIN — ACETAMINOPHEN 975 MG: 325 TABLET, FILM COATED ORAL at 13:59

## 2019-08-02 RX ADMIN — GABAPENTIN 200 MG: 100 CAPSULE ORAL at 08:20

## 2019-08-02 RX ADMIN — FAMOTIDINE 20 MG: 20 TABLET ORAL at 21:19

## 2019-08-02 RX ADMIN — MELATONIN TAB 3 MG 6 MG: 3 TAB at 21:19

## 2019-08-02 RX ADMIN — DICLOFENAC 2 G: 10 GEL TOPICAL at 11:53

## 2019-08-02 RX ADMIN — METOPROLOL TARTRATE 25 MG: 25 TABLET, FILM COATED ORAL at 19:46

## 2019-08-02 RX ADMIN — SERTRALINE HYDROCHLORIDE 75 MG: 50 TABLET ORAL at 08:20

## 2019-08-02 RX ADMIN — ACETAMINOPHEN 975 MG: 325 TABLET, FILM COATED ORAL at 08:19

## 2019-08-02 RX ADMIN — CLONIDINE HYDROCHLORIDE 0.1 MG: 0.1 TABLET ORAL at 19:46

## 2019-08-02 RX ADMIN — BACLOFEN 10 MG: 10 TABLET ORAL at 21:19

## 2019-08-02 RX ADMIN — GABAPENTIN 200 MG: 100 CAPSULE ORAL at 13:59

## 2019-08-02 RX ADMIN — AMLODIPINE BESYLATE 5 MG: 5 TABLET ORAL at 08:20

## 2019-08-02 RX ADMIN — ACETAMINOPHEN 975 MG: 325 TABLET, FILM COATED ORAL at 19:46

## 2019-08-02 RX ADMIN — GABAPENTIN 200 MG: 100 CAPSULE ORAL at 19:46

## 2019-08-02 RX ADMIN — DICLOFENAC 2 G: 10 GEL TOPICAL at 08:24

## 2019-08-02 RX ADMIN — METOPROLOL TARTRATE 25 MG: 25 TABLET, FILM COATED ORAL at 08:20

## 2019-08-02 ASSESSMENT — ACTIVITIES OF DAILY LIVING (ADL)
RETIRED_EATING: 2-->ASSISTIVE PERSON
COGNITION: 0 - NO COGNITION ISSUES REPORTED
RETIRED_COMMUNICATION: 0-->UNDERSTANDS/COMMUNICATES WITHOUT DIFFICULTY
BATHING: 3-->ASSISTIVE EQUIPMENT AND PERSON
TOILETING: 3-->ASSISTIVE EQUIPMENT AND PERSON
WHICH_OF_THE_ABOVE_FUNCTIONAL_RISKS_HAD_A_RECENT_ONSET_OR_CHANGE?: AMBULATION;TRANSFERRING;TOILETING;BATHING;DRESSING;FALL HISTORY
AMBULATION: 3-->ASSISTIVE EQUIPMENT AND PERSON
TRANSFERRING: 3-->ASSISTIVE EQUIPMENT AND PERSON
NUMBER_OF_TIMES_PATIENT_HAS_FALLEN_WITHIN_LAST_SIX_MONTHS: 4
FALL_HISTORY_WITHIN_LAST_SIX_MONTHS: YES
DRESS: 3-->ASSISTIVE EQUIPMENT AND PERSON
SWALLOWING: 0-->SWALLOWS FOODS/LIQUIDS WITHOUT DIFFICULTY

## 2019-08-02 NOTE — PROGRESS NOTES
SWS ( brief note for SW consult)      D: Received request to see regarding discharge planning. Chart reviewed noting pt's admit after a fall after a fall at home. Noted per ortho PA documentation the findings of unstable L5 vertebral body fracture, neurospine consult pending.             It is noted in chart revewed that pt is currently residing at [facility] in [place].       A/P: Specifics of discharge needs undetermined at this time, SW following.

## 2019-08-02 NOTE — PROGRESS NOTES
Reviewed imaging with Dr. Burnett. Recommend obtaining dedicated lumbar CT, orthotics consult, and PT. Discussed plan with patient daughter who is in agreement.    - continue to wear LSO at all times when out of bed  - Repeat lumbar spine CT in 6 weeks.   - Return to clinic following repeat CT spine in 6 weeks   - Call the [facility] for any questions or concerns during interim, at [phone]  - Seek medical attention immediately if any paresthesias, weakness, gait instability, bowel/bladder incontinence, new pain.    Elena Lu PA-C  [facility]  [address] [place] [id number]  Tel [phone]  Fax [phone]

## 2019-08-02 NOTE — PLAN OF CARE
PRIMARY DIAGNOSIS: ACUTE PAIN  OUTPATIENT/OBSERVATION GOALS TO BE MET BEFORE DISCHARGE:  1. Pain Status: Improved-controlled with oral pain medications.    2. Return to near baseline physical activity: No    3. Cleared for discharge by consultants (if involved): No    Discharge Planner Nurse   Safe discharge environment identified: Pt comes from [facility]   Barriers to discharge: Yes       Entered by: Francesca Galvez 08/02/2019 5:01 AM     VSS. Assist x2, Purewick in place. Pain controlled with scheduled tylenol, voltaren gel ordered. MRI to be done in AM. Ortho and PT consulted. Will continue to monitor.   Please review provider order for any additional goals.   Nurse to notify provider when observation goals have been met and patient is ready for discharge.

## 2019-08-02 NOTE — PROGRESS NOTES
MR reviewed - neurospine consulted as reported perhaps unstable L5 vertebral body fracture    Will defer at this time, reconsult if needed    Radha Bolaños PAC

## 2019-08-02 NOTE — PROGRESS NOTES
[facility]  Observation Unit  Progress Note    Date of Service: 8/2/2019    Patient: Vanda Banks  MRN: [id number]  Admission Date: 8/1/2019  Hospital Day # 2    Assessment & Plan: Vanda Banks is a 88 year old female with history of hypertension, depression, hemorrhagic stroke last September, osteoarthritis, EP ablation for SVT, GERD, depression, and previous back surgery.  She presented to the emergency department from orthopedic surgery clinic ([facility]).  About 2 weeks ago she had a mechanical fall while walking to the bathroom at her assisted living facility.  Initially, she did not really have any pain.  2 days later she began to have pain in her right lateral hip that radiated down her right lateral thigh.  She went to urgent care at [facility].  She had an x-ray of her right hip that showed no fracture.  She went home.  Over the next week her symptoms progressed.  She went back to [facility] for follow-up.  She was not really able to walk on her own.  Her pain was significant.  She was referred to the emergency department for evaluation.  Non-contrast CT of the right hip without contrast was obtained and showed no fracture.  It did show mild bilateral hip degenerative changes.  She was unable to ambulate and admitted.    Right Hip Pain  L5 Fracture, Unstable - pt with pain following a mechanical fall 2 weeks ago.  Imaging thus far negative for acute findings as above.  Therefore, MRI of the Lumbar Spine was obtained which revealed an unstable L5 fracture.  Neurosurgery was consulted and initially planned for transfer to [facility] for surgery, however after further evaluation it was determined she be placed in a lumbar brace and followed up as an outpatient.  Transfer now cancelled.  - continue Tylenol, Neurontin, oxycodone, Voltaren gel, and Lidoderm patches will be available for use as needed.    - Orthotics consulted  - Physical therapy consult  -  "Neurosurgery consult  - CT L/S ordered per Nsx    Depression -  pt family reported they were concerned about her depression on admission and her prior to admission Zoloft was increased from 50 mg to 75 mg daily.     Hypertension -  Continue prior to admission amlodipine, clonidine, and metoprolol (all with hold parameters).     Hemorrhagic stroke - related to hypertension in 9/18.    CODE: DNR  DVT: scd  Diet/fluids: regular      Radha Williamson MS PA-C  Hospitalist Physician Assistant  [facility]  Pager: [phone]      Subjective & Interval Hx:    Patient reports right hip pain is 8/10.  She has some tingling to her right foot.  She has baseline difficulty with urinary incontinence which has been unchanged.  She denies any bowel incontinence.  She denies chest pain, shortness of breath, abdominal pain.  She has been tolerating po well.    Last 24 hr care team notes reviewed.   ROS:  4 point ROS including Respiratory, CV, GI and , other than that noted in the HPI, is negative.    Physical Exam:    Blood pressure 110/68, pulse 73, temperature 97.6  F (36.4  C), temperature source Oral, resp. rate 18, height 1.651 m (5' 5\"), weight 57.1 kg (125 lb 15.7 oz), SpO2 99 %.  General: Alert, interactive, NAD, lying in bed with daughter at the bedside.  HEENT: AT/NC, sclera anicteric, PERRL, EOMI, OP clear with MMM  Resp: clear to auscultation bilaterally, no crackles or wheezes  Cardiac: regular rate and rhythm, no murmur  Abdomen: Soft, nontender, nondistended. +BS.  No rebound or guarding.  Extremities: No LE edema.  Skin: Warm and dry, no jaundice or rash  Neuro: Alert & oriented x 3, FROM testing deferred at this time.  Sensation grossly intact.    Labs & Images:  Reviewed in Epic   Medications:    Current Facility-Administered Medications   Medication     acetaminophen (TYLENOL) tablet 975 mg     amLODIPine (NORVASC) tablet 5 mg     baclofen (LIORESAL) tablet 10 mg     bisacodyl (DULCOLAX) Suppository " 10 mg     cloNIDine (CATAPRES) tablet 0.1 mg     diclofenac (VOLTAREN) 1 % topical gel 2 g     famotidine (PEPCID) tablet 20 mg     gabapentin (NEURONTIN) capsule 200 mg     ibuprofen (ADVIL/MOTRIN) tablet 400 mg     Lidocaine (LIDOCARE) 4 % Patch 1 patch     lidocaine patch in PLACE     lidocaine patch REMOVAL     magnesium hydroxide (MILK OF MAGNESIA) suspension 30 mL     melatonin tablet 6 mg     metoprolol tartrate (LOPRESSOR) tablet 25 mg     naloxone (NARCAN) injection 0.1-0.4 mg     ondansetron (ZOFRAN-ODT) ODT tab 4 mg    Or     ondansetron (ZOFRAN) injection 4 mg     oxyCODONE IR (ROXICODONE) half-tab 2.5 mg     prochlorperazine (COMPAZINE) injection 5 mg    Or     prochlorperazine (COMPAZINE) tablet 5 mg    Or     prochlorperazine (COMPAZINE) Suppository 12.5 mg     senna-docusate (SENOKOT-S/PERICOLACE) 8.6-50 MG per tablet 1 tablet    Or     senna-docusate (SENOKOT-S/PERICOLACE) 8.6-50 MG per tablet 2 tablet     sertraline (ZOLOFT) tablet 75 mg

## 2019-08-02 NOTE — PLAN OF CARE
PRIMARY DIAGNOSIS: ACUTE PAIN  OUTPATIENT/OBSERVATION GOALS TO BE MET BEFORE DISCHARGE:  1. Pain Status: Improved-controlled with oral pain medications.    2. Return to near baseline physical activity: No    3. Cleared for discharge by consultants (if involved): No    Discharge Planner Nurse   Safe discharge environment identified: Pt comes from [facility]   Barriers to discharge: Yes       Entered by: Francesca Galvez 08/02/2019 1:36 AM     VSS. Assist x2, unable to bear weight at this time. Purewick in place. Pain controlled with scheduled tylenol, voltaren gel ordered. MRI to be done in AM. Ortho and PT consulted. Will continue to monitor.   Please review provider order for any additional goals.   Nurse to notify provider when observation goals have been met and patient is ready for discharge.

## 2019-08-02 NOTE — DISCHARGE SUMMARY
[facility] Observation Unit Discharge Summary          Vanda Banks MRN# [id number]   Age: 88 year old YOB: 1930     Date of Admission:  8/1/2019  Date of Discharge::  8/2/2019  Admitting Physician:  Gerson Mayorga MD  Discharge Physician:  Radha Williamson PA-C  Primary Physician: Julee Jauregui     Primary Discharge Diagnoses:   L5 Fracture, Unstable  Right Hip Pain     Secondary Discharge Diagnoses:   Depression  HTN  Hemorrhagic CVA     Hospital Course:   For detail history, please refer to H & P from 8/1/2019. In brief, this is a 88 year old female with history of hypertension, depression, hemorrhagic stroke last September, osteoarthritis, EP ablation for SVT, GERD, depression, and previous back surgery.  She presented to the emergency department this afternoon from orthopedic surgery clinic ([facility]).  About 2 weeks ago she had a mechanical fall while walking to the bathroom at her assisted living facility.  Initially, she did not really have any pain.  2 days later she began to have pain in her right lateral hip that radiated down her right lateral thigh.  She went to urgent care at [facility].  She had an x-ray of her right hip that showed no fracture.  She went home.  Over the next week her symptoms progressed.  She went back to [facility] today for follow-up.  She was not really able to walk on her own.  Her pain was significant.  She was referred to the emergency department for evaluation.  MRI of the lumbar spine was recommended by the provider that saw her in clinic.  Emergency department here showed stable vital signs.  No labs were obtained.  Non-contrast CT of the right hip without contrast was obtained and showed no fracture.  It did show mild bilateral hip degenerative changes.  She was unable to ambulate.    Patient was admitted to the observation unit.  She underwent an MRI of the Lumbar Spine which revealed a  recent-appearing nondisplaced unstable L5 fracture.  Neurosurgery was consulted and recommend transfer to [facility] for possible surgical intervention.  Patient was transferred under the care of Internal Medicine with Dr. Yusuf accepting in transfer.      Procedures/Imaging:     Results for orders placed or performed during the hospital encounter of 08/01/19   CT Hip Right w/o Contrast    Narrative    CT RIGHT HIP WITHOUT CONTRAST  8/1/2019 4:10 PM     HISTORY: Right hip pain after fall 2 weeks ago.    TECHNIQUE: Axial scans were performed through the pelvis with sagittal  and coronal reconstruction. Radiation dose for this scan was reduced  using automated exposure control, adjustment of the mA and/or kV  according to patient size, or iterative reconstruction technique.    COMPARISON: None.    FINDINGS: Moderate bilateral hip degenerative changes, slightly  greater on the right than the left. No evidence of acute fracture or  subluxation. No evidence of sacral fracture. Pubic rami are intact. No  acetabular fracture. Degenerative changes in the lower lumbar spine.      Impression    IMPRESSION: No evidence of acute fracture or subluxation. Moderate  bilateral hip degenerative changes.    LINDEN AMBROCIO MD   MR Lumbar Spine w/o Contrast    Narrative    MRI OF THE LUMBAR SPINE WITHOUT CONTRAST 8/2/2019 7:55 AM     COMPARISON: None    HISTORY: Radiculopathy, less than 6 weeks, no red flags, no prior  management; right lateral hip pain.    TECHNIQUE: Multiplanar, multisequence MRI images of the lumbar spine  were acquired without IV contrast.    FINDINGS: There are five lumbar-type vertebrae for the purposes of  this dictation.     There is a fracture line through the inferior third of the L5  vertebral body with associated surrounding bone marrow edema. The  fracture line does not definitely extend into the posterior elements  on either side but does extend through the entirety of the vertebral  body. No other  fractures noted. Vertebral body heights and contours of  the lumbar spine are otherwise normal. There is moderate right convex  curvature of the lumbar spine centered at the L2 level. There is mild  right lateral listhesis of L2 upon L3 and of L3 upon L4. Alignment of  the lumbar vertebrae is otherwise normal.    There is loss of disc height, disc desiccation and posterior disc  bulging to varying degrees at all levels of the lumbar spine with the  exception of the relatively normal-appearing L5-S1 disc.    The tip of the conus medullaris is at the upper L2 level which is  within normal limits. There is no evidence for intrathecal  abnormality.     Level by level:     L1-L2: There is a circumferential disc bulge, circumferential endplate  spurring and mild facet arthropathy bilaterally. These findings result  in moderate left foraminal stenosis, mild right foraminal narrowing  and mild spinal canal narrowing.    L2-L3: There is a circumferential disc bulge, circumferential endplate  spurring, severe facet arthropathy bilaterally and marked ligamentum  flavum thickening bilaterally. These findings result in moderate  spinal canal stenosis and moderate bilateral neural foraminal  stenosis.    L3-L4: There is a circumferential disc bulge, circumferential endplate  spurring, moderate facet arthropathy bilaterally and thickening of the  ligamentum flavum bilaterally. These findings result in moderate right  foraminal stenosis, mild-moderate left foraminal narrowing and mild  spinal canal narrowing.    L4-L5: There is a minimal circumferential disc bulge and moderate  facet arthropathy bilaterally. These findings result in mild right  foraminal narrowing but no spinal canal or left foraminal stenosis.    L5-S1: There is severe facet arthropathy bilaterally. There is mild  bilateral neural foraminal narrowing but no spinal canal stenosis.      Impression    IMPRESSION:  1. Recent-appearing nondisplaced fracture through the  "inferior third  of the L5 vertebral body that does not appear to involve the posterior  elements. However, given this fracture does encompass the entire AP  diameter of the vertebral body, it is considered an unstable fracture.  2. Diffuse degenerative changes of the lumbar spine as described  above.    This imaging study, including the abnormal finding of recent L5  fracture, was discussed with the attending physician, Dr. Echeverria at  [facility], by Dr. Pierce on 8/2/2019 9:00 AM.    PRINCESS PIERCE MD     Consultations:   Neurosurgery    Code Status:   DNR    Allergies:     Allergies   Allergen Reactions     Azithromycin Diarrhea     Fenofibrate      Gemfibrozil      Levaquin [Levofloxacin] GI Disturbance     Penicillin G      Rosuvastatin      Vytorin      Bextra [Valdecoxib] Rash     Diltiazem Rash     Hydrochlorothiazide Rash     Sulfa Antibiotics [Sulfa Drugs] Rash     Verapamil Rash        Subjective:   Patient reports right hip pain is 8/10.  She has some tingling to her right foot.  She has baseline difficulty with urinary incontinence which has been unchanged.  She denies any bowel incontinence.  She denies chest pain, shortness of breath, abdominal pain.  She has been tolerating po well.    Physical Exam:   Blood pressure 129/78, pulse 75, temperature 97.5  F (36.4  C), temperature source Oral, resp. rate 16, height 1.651 m (5' 5\"), weight 57.1 kg (125 lb 15.7 oz), SpO2 99 %.  General: Alert, interactive, NAD, lying in bed with daughter at the bedside.  HEENT: AT/NC, sclera anicteric, PERRL, EOMI, OP clear with MMM  Resp: clear to auscultation bilaterally, no crackles or wheezes  Cardiac: regular rate and rhythm, no murmur  Abdomen: Soft, nontender, nondistended. +BS.  No rebound or guarding.  Extremities: No LE edema.  Skin: Warm and dry, no jaundice or rash  Neuro: Alert & oriented x 3, FROM testing deferred at this time.  Sensation grossly intact.     Discharge Medicatios:        Current Discharge " Medication List      CONTINUE these medications which have NOT CHANGED    Details   acetaminophen (TYLENOL) 325 MG tablet Take 650 mg by mouth every 6 hours as needed for mild pain      amLODIPine (NORVASC) 5 MG tablet Take 5 mg by mouth daily      baclofen (LIORESAL) 10 MG tablet TAKE 1 TABLET BY MOUTH AT BEDTIME  Qty: 28 tablet, Refills: 98    Associated Diagnoses: Muscle spasm      cloNIDine (CATAPRES) 0.1 MG tablet Take 0.1 mg by mouth 2 times daily      famotidine (PEPCID) 20 MG tablet TAKE 1 TABLET BY MOUTH ONCE DAILY  Qty: 31 tablet, Refills: 98    Associated Diagnoses: Gastroesophageal reflux disease, esophagitis presence not specified      gabapentin (NEURONTIN) 100 MG capsule Take 100 mg by mouth 2 times daily      melatonin 3 MG tablet TAKE TWO TABLETS (6MG) BY MOUTH AT BEDTIME  Qty: 56 tablet, Refills: 98    Associated Diagnoses: Insomnia, unspecified type      metoprolol tartrate (LOPRESSOR) 25 MG tablet TAKE 1 TABLET BY MOUTH TWICE DAILY  Qty: 62 tablet, Refills: 98    Associated Diagnoses: Hypertension, unspecified type      sertraline (ZOLOFT) 50 MG tablet Take 50 mg by mouth daily             Instructions Given to Patient as Discharge:     Discharge Procedure Orders   Reason for your hospital stay   Order Comments: You were hospitalized due to right hip pain.  Imaging revealed an L5 fracture.  Neurosurgery was consulted and requested you transfer to [facility].       Pending Tests at Discharge:   none    Discharge Disposition:   Transferred to [facility]      Radha Williamson MS, PA-C  Hospitalist Service  Pager [phone]    >30 minutes was spent in discharge planning, care coordination, physical examination and medication reconciliation on the date of discharge, 8/2/2019

## 2019-08-02 NOTE — CONSULTS
[facility]    Neurosurgery Consultation     Date of Admission:  8/1/2019  Date of Consult (When I saw the patient): 08/02/19    Assessment & Plan   Vanda Banks is a 88 year old female who was admitted on 8/1/2019. I was asked to see the patient for L5 fracture.    Active Problems:    Hip pain, right    L5 fracture, unstable.    Assessment: Unstable L5 vertebra fracture, mild right EHL weakness, otherwise currently neurologically intact.    Plan: initially it was recommended by Dr. Burnett that pt needed transfer to [facility] for further management,  However after further evaluation it was determined she be placed in a lumbar brace and followed up as an outpatient.      I have discussed the following assessment and plan with the Dr. Burnett who is in agreement with initial plan and will follow up with further consultation recommendations.    Alex Grajeda PA-C    [facility]  [facility]    Pager [phone]        Code Status    DNR    Reason for Consult   Reason for consult: I was asked by Dr. Echeverria to evaluate this patient for L5 vertebra fracture.    Primary Care Physician   uJlee Jauregui    Chief Complaint   Right hip pain.    History is obtained from the patient and chart    History of Present Illness   Vanda Banks is a 88 year old female who presents with Right hip pain through the ED.      Vanda Banks is an 88-year-old female with history of hypertension, depression, hemorrhagic stroke last September, osteoarthritis, EP ablation for atrial fibrillation, GERD, depression, and previous back surgery.  She presented to the emergency department this afternoon from [facility].      About 2 weeks ago she had a mechanical fall while walking to the bathroom at her assisted living facility. Two days later she began to have pain in her right lateral hip that radiated down her right lateral thigh.  She was seen at ortho urgent care [facility], and x-ray of her  right hip that showed no fracture. Over the next week her symptoms progressed, she returned [facility] yesterday.  At that point, she was not really able to walk on her own and her pain was significant.  She was referred to the emergency department for evaluation.      In ED MRI of the lumbar spine showed L5 unstable fracture,   Non-contrast CT of the right hip showed no fracture.  She was admited  to observation for pain control and further evaluation.    Today she states pain is tolerable at rest, however any motion with the back or right leg causes severe pain at the hip and lower back regions.  She denies bowel or bladder changes.    Past Medical History   I have reviewed this patient's medical history and updated it with pertinent information if needed.   Past Medical History:   Diagnosis Date     Cerebral infarction (H)      Chronic osteoarthritis      Depressive disorder      Hypertension        Past Surgical History   I have reviewed this patient's surgical history and updated it with pertinent information if needed.  Past Surgical History:   Procedure Laterality Date     BACK SURGERY       EP ABLATION SVT         Prior to Admission Medications   Prior to Admission Medications   Prescriptions Last Dose Informant Patient Reported? Taking?   acetaminophen (TYLENOL) 325 MG tablet   Yes Yes   Sig: Take 650 mg by mouth every 6 hours as needed for mild pain   amLODIPine (NORVASC) 5 MG tablet 8/1/2019 at am  Yes Yes   Sig: Take 5 mg by mouth daily   baclofen (LIORESAL) 10 MG tablet 7/31/2019 at Unknown time  No Yes   Sig: TAKE 1 TABLET BY MOUTH AT BEDTIME   cloNIDine (CATAPRES) 0.1 MG tablet 8/1/2019 at am  Yes Yes   Sig: Take 0.1 mg by mouth 2 times daily   famotidine (PEPCID) 20 MG tablet 7/31/2019 at hs  No Yes   Sig: TAKE 1 TABLET BY MOUTH ONCE DAILY   gabapentin (NEURONTIN) 100 MG capsule 8/1/2019 at am  Yes Yes   Sig: Take 100 mg by mouth 2 times daily   melatonin 3 MG tablet 7/31/2019 at Unknown  time  No Yes   Sig: TAKE TWO TABLETS (6MG) BY MOUTH AT BEDTIME   metoprolol tartrate (LOPRESSOR) 25 MG tablet 2019 at am  No Yes   Sig: TAKE 1 TABLET BY MOUTH TWICE DAILY   sertraline (ZOLOFT) 50 MG tablet 2019 at am  Yes Yes   Sig: Take 50 mg by mouth daily      Facility-Administered Medications: None     Allergies   Allergies   Allergen Reactions     Azithromycin Diarrhea     Fenofibrate      Gemfibrozil      Levaquin [Levofloxacin] GI Disturbance     Penicillin G      Rosuvastatin      Vytorin      Bextra [Valdecoxib] Rash     Diltiazem Rash     Hydrochlorothiazide Rash     Sulfa Antibiotics [Sulfa Drugs] Rash     Verapamil Rash       Social History   I have reviewed this patient's social history and updated it with pertinent information if needed. Vanda Banks  reports that she has never smoked. She has never used smokeless tobacco. She reports that she does not drink alcohol or use drugs.    Family History   I have reviewed this patient's family history and updated it with pertinent information if needed.   Family History   Problem Relation Age of Onset     Colon Cancer Mother          in 70's     Cerebrovascular Disease Father          of stroke in 50's     Hypertension Brother      Coronary Artery Disease Brother      Hypertension Brother      Coronary Artery Disease Brother        Review of Systems   CONSTITUTIONAL: NEGATIVE for fever, chills, change in weight  INTEGUMENTARY/SKIN: NEGATIVE for worrisome rashes, moles or lesions  EYES: NEGATIVE for vision changes or irritation  ENT/MOUTH: NEGATIVE for ear, mouth and throat problems  RESP: NEGATIVE for significant cough or SOB  BREAST: NEGATIVE for masses, tenderness or discharge  CV: NEGATIVE for chest pain, palpitations or peripheral edema  GI: NEGATIVE for nausea, abdominal pain, heartburn, or change in bowel habits  : NEGATIVE for frequency, dysuria, or hematuria  MUSCULOSKELETAL:POSITIVE  for back pain, and hip pain and right leg  "weakness  NEURO: POSITIVE for right leg weakness.  ENDOCRINE: NEGATIVE for temperature intolerance, skin/hair changes  HEME: NEGATIVE for bleeding problems  PSYCHIATRIC: NEGATIVE for changes in mood or affect    Physical Exam   Temp: 97.5  F (36.4  C) Temp src: Oral BP: 129/78 Pulse: 75 Heart Rate: 93 Resp: 16 SpO2: 99 % O2 Device: None (Room air)    Vital Signs with Ranges  Temp:  [96.7  F (35.9  C)-99.2  F (37.3  C)] 97.5  F (36.4  C)  Pulse:  [75-76] 75  Heart Rate:  [58-93] 93  Resp:  [16-20] 16  BP: (106-145)/(71-91) 129/78  SpO2:  [96 %-99 %] 99 %  125 lbs 15.68 oz    Heart Rate: 93, Blood pressure 129/78, pulse 75, temperature 97.5  F (36.4  C), temperature source Oral, resp. rate 16, height 1.651 m (5' 5\"), weight 57.1 kg (125 lb 15.7 oz), SpO2 99 %.  125 lbs 15.68 oz  HEENT:  Normocephalic, atraumatic.  PERRLA.  EOM s intact.   Neck:  Supple, non-tender, without lymphadenopathy.  Heart:  No peripheral edema  Lungs:  No SOB  Abdomen:  Soft, non-tender, non-distended.  Normal bowel sounds.  Skin:  Warm and dry, good capillary refill.  Extremities:  Good radial and dorsalis pedis pulses bilaterally, no edema, cyanosis or clubbing.    NEUROLOGICAL EXAMINATION:   Unable to get out of bed due to pain.    Mental status:  Alert and Oriented x 3, speech is fluent.  Cranial nerves:  II-XII intact.   Motor:    Shoulder Abduction:  Right:  5/5   Left:  5/5  Biceps:                      Right:  5/5   Left:  5/5  Triceps:                     Right:  5/5   Left:  5/5  Wrist Extensors:       Right:  5/5   Left:  5/5  Wrist Flexors:           Right:  5/5   Left:  5/5  interosseus :            Right:  5/5   Left:  5/5   Hip Flexor:                Right: 5/5  Left:  5/5  Hip Adductor:             Right:  5/5  Left:  5/5  Hip Abductor:             Right:  5/5  Left:  5/5  Gastroc Soleus:        Right:  5/5  Left:  5/5  Tib/Ant:                      Right:  5/5  Left:  5/5  EHL:                     Right:  4/5  Left:  " 5/5  Sensation:  intact  Reflexes:   Negative Babinski.  Negative Clonus.    Coordination:  Smooth finger to nose testing.   Negative pronator drift.      Cervical examination reveals good range of motion.  No tenderness to palpation of the cervical spine or paraspinous muscles bilaterally.     Lumbar examination reveals L5 tenderness of the spine or paraspinous muscles.  Hip height is symmetrical. Negative SI joint, sciatic notch or greater trochanteric tenderness to palpation bilaterally.  Straight leg raise is negative bilaterally for radicular, but positive for low back and right hip pain.      Data   All new lab and imaging data was personally reviewed by me.  Recent Results (from the past 24 hour(s))   CT Hip Right w/o Contrast    Narrative    CT RIGHT HIP WITHOUT CONTRAST  8/1/2019 4:10 PM     HISTORY: Right hip pain after fall 2 weeks ago.    TECHNIQUE: Axial scans were performed through the pelvis with sagittal  and coronal reconstruction. Radiation dose for this scan was reduced  using automated exposure control, adjustment of the mA and/or kV  according to patient size, or iterative reconstruction technique.    COMPARISON: None.    FINDINGS: Moderate bilateral hip degenerative changes, slightly  greater on the right than the left. No evidence of acute fracture or  subluxation. No evidence of sacral fracture. Pubic rami are intact. No  acetabular fracture. Degenerative changes in the lower lumbar spine.      Impression    IMPRESSION: No evidence of acute fracture or subluxation. Moderate  bilateral hip degenerative changes.    LINDEN AMBROCIO MD   MR Lumbar Spine w/o Contrast    Narrative    MRI OF THE LUMBAR SPINE WITHOUT CONTRAST 8/2/2019 7:55 AM     COMPARISON: None    HISTORY: Radiculopathy, less than 6 weeks, no red flags, no prior  management; right lateral hip pain.    TECHNIQUE: Multiplanar, multisequence MRI images of the lumbar spine  were acquired without IV contrast.    FINDINGS: There are five  lumbar-type vertebrae for the purposes of  this dictation.     There is a fracture line through the inferior third of the L5  vertebral body with associated surrounding bone marrow edema. The  fracture line does not definitely extend into the posterior elements  on either side but does extend through the entirety of the vertebral  body. No other fractures noted. Vertebral body heights and contours of  the lumbar spine are otherwise normal. There is moderate right convex  curvature of the lumbar spine centered at the L2 level. There is mild  right lateral listhesis of L2 upon L3 and of L3 upon L4. Alignment of  the lumbar vertebrae is otherwise normal.    There is loss of disc height, disc desiccation and posterior disc  bulging to varying degrees at all levels of the lumbar spine with the  exception of the relatively normal-appearing L5-S1 disc.    The tip of the conus medullaris is at the upper L2 level which is  within normal limits. There is no evidence for intrathecal  abnormality.     Level by level:     L1-L2: There is a circumferential disc bulge, circumferential endplate  spurring and mild facet arthropathy bilaterally. These findings result  in moderate left foraminal stenosis, mild right foraminal narrowing  and mild spinal canal narrowing.    L2-L3: There is a circumferential disc bulge, circumferential endplate  spurring, severe facet arthropathy bilaterally and marked ligamentum  flavum thickening bilaterally. These findings result in moderate  spinal canal stenosis and moderate bilateral neural foraminal  stenosis.    L3-L4: There is a circumferential disc bulge, circumferential endplate  spurring, moderate facet arthropathy bilaterally and thickening of the  ligamentum flavum bilaterally. These findings result in moderate right  foraminal stenosis, mild-moderate left foraminal narrowing and mild  spinal canal narrowing.    L4-L5: There is a minimal circumferential disc bulge and moderate  facet  arthropathy bilaterally. These findings result in mild right  foraminal narrowing but no spinal canal or left foraminal stenosis.    L5-S1: There is severe facet arthropathy bilaterally. There is mild  bilateral neural foraminal narrowing but no spinal canal stenosis.      Impression    IMPRESSION:  1. Recent-appearing nondisplaced fracture through the inferior third  of the L5 vertebral body that does not appear to involve the posterior  elements. However, given this fracture does encompass the entire AP  diameter of the vertebral body, it is considered an unstable fracture.  2. Diffuse degenerative changes of the lumbar spine as described  above.    This imaging study, including the abnormal finding of recent L5  fracture, was discussed with the attending physician, Dr. Echeverria at  [facility], by Dr. Pierce on 8/2/2019 9:00 AM.    PRINCESS PIERCE MD       CBC RESULTS:   Recent Labs   Lab Test 08/01/19  2300   WBC 13.3*   RBC 4.91   HGB 13.8   HCT 43.2   MCV 88   MCH 28.1   MCHC 31.9   RDW 13.4        Basic Metabolic Panel:  Lab Results   Component Value Date     08/01/2019      Lab Results   Component Value Date    POTASSIUM 3.9 08/01/2019     Lab Results   Component Value Date    CHLORIDE 110 08/01/2019     Lab Results   Component Value Date    SHEN 8.3 08/01/2019     Lab Results   Component Value Date    CO2 24 08/01/2019     Lab Results   Component Value Date    BUN 34 08/01/2019     Lab Results   Component Value Date    CR 0.92 08/01/2019     Lab Results   Component Value Date     08/01/2019     INR:  No results found for: INR

## 2019-08-02 NOTE — H&P
[facility]  History and Physical   Hospitalist Service    Gerson Mayorga MD    Vanda Banks MRN# [id number]   YOB: 1930 Age: 88 year old      Date of Admission:  8/1/2019           Assessment and Plan:   Vanda Banks is an 88-year-old female with history of hypertension, depression, hemorrhagic stroke last September, osteoarthritis, EP ablation for atrial fibrillation, GERD, depression, and previous back surgery.  She presented to the emergency department this afternoon from orthopedic surgery clinic ([facility]).  About 2 weeks ago she had a mechanical fall while walking to the bathroom at her assisted living facility.  Initially, she did not really have any pain.  2 days later she began to have pain in her right lateral hip that radiated down her right lateral thigh.  She went to urgent care at [facility].  She had an x-ray of her right hip that showed no fracture.  She went home.  Over the next week her symptoms progressed.  She went back to [facility] today for follow-up.  She was not really able to walk on her own.  Her pain was significant.  She was referred to the emergency department for evaluation.  MRI of the lumbar spine was recommended by the provider that saw her in clinic.  Emergency department here showed stable vital signs.  No labs were obtained.  Non-contrast CT of the right hip without contrast was obtained and showed no fracture.  It did show mild bilateral hip degenerative changes.  She was unable to ambulate.  I was asked to admit her to observation for pain control and further evaluate lateral hip pain.    Problem list:    1.  Right lateral hip pain with radiation down the lateral thigh.  This occurred a couple of days after a mechanical fall.  She had x-ray of her right hip and now CT, either of which showed fracture.  She was seen in orthopedic surgery clinic and referred here for further evaluation.  MRI of the  lumbar spine was recommended.  I agree that this could be radicular pain due to a bulging disc.  Of note, Vanda does have history of lumbar spinal fusion 50 years ago.  I ordered MRI of lumbar spine for morning.  No explanation is found on MRI of lumbar spine and an MRI of right hip could be obtained.  For pain control I have scheduled Tylenol and have increased her prior to admission Neurontin from 100 mg twice a day to 200 mg 3 times a day.  Ibuprofen, oxycodone, Voltaren gel, and Lidoderm patches will be available for use as needed.  Physical therapy has been ordered.  I also will ask orthopedic surgery ([facility]) to see her.    2.  Depression.  Vanda's daughter is present.  She is concerned about depression.  Vanda had a hemorrhagic stroke last September.  She has left upper extremity palegia related to this.  Her  left her after the stroke because he did not want to provide care for her.  Her daughter moved Vanda here so that she would be closer to her.  Vanda reports some symptoms of depression.  Increase prior to admission Zoloft from 50 mg to 75 mg daily.    3.  Hypertension.  Continue prior to admission amlodipine, clonidine, and metoprolol (all with hold parameters).    4.  Hemorrhagic stroke related to hypertension in 9/18.    DNR.  This was discussed with Vanda and her daughter who, incidentally, is a nurse practitioner with [employer] [facility] [place] that works here at [facility] [facility].  Ambulate for DVT prophylaxis  Disposition: Admit to observation           Code Status:   Full Code         Primary Care Physician:   Julee Jauregui [phone]         Chief Complaint:   Right lateral hip pain    History is obtained from Vanda, her daughter, ED provider, and the medical record         History of Present Illness:   Vanda Banks is an 88-year-old female with history of hypertension, depression, hemorrhagic stroke last September, osteoarthritis, EP  ablation for atrial fibrillation, GERD, depression, and previous back surgery.  She presented to the emergency department this afternoon from orthopedic surgery clinic ([facility]).  About 2 weeks ago she had a mechanical fall while walking to the bathroom at her assisted living facility.  Initially, she did not really have any pain.  2 days later she began to have pain in her right lateral hip that radiated down her right lateral thigh.  She went to urgent care at [facility].  She had an x-ray of her right hip that showed no fracture.  She went home.  Over the next week her symptoms progressed.  She went back to [facility] today for follow-up.  She was not really able to walk on her own.  Her pain was significant.  She was referred to the emergency department for evaluation.  MRI of the lumbar spine was recommended by the provider that saw her in clinic.  Emergency department here showed stable vital signs.  No labs were obtained.  Non-contrast CT of the right hip without contrast was obtained and showed no fracture.  It did show mild bilateral hip degenerative changes.  She was unable to ambulate.  I was asked to admit her to observation for pain control and further evaluate lateral hip pain.           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, unspecified hemiplegia type (H)     Current moderate episode of major depressive disorder, unspecified whether recurrent (H)     Essential hypertension     Osteoarthritis of multiple joints, unspecified osteoarthritis type     Gastroesophageal reflux disease, esophagitis presence not specified     Pain     Dementia without behavioral disturbance, unspecified dementia type     Dysphagia, unspecified type     Insomnia, unspecified type     Female bladder prolapse     Fall, initial encounter     Hip pain, right      Right hip pain      Past Medical History:   Diagnosis Date     Cerebral infarction (H)      Chronic osteoarthritis      Depressive disorder      Hypertension              Past Surgical History:     Past Surgical History:   Procedure Laterality Date     BACK SURGERY       EP ABLATION SVT              Home Medications:     Prior to Admission medications    Medication Sig Last Dose Taking? Auth Provider   acetaminophen (TYLENOL) 325 MG tablet Take 650 mg by mouth every 6 hours as needed for mild pain  Yes Reported, Patient   amLODIPine (NORVASC) 5 MG tablet Take 5 mg by mouth daily 8/1/2019 at am Yes Reported, Patient   baclofen (LIORESAL) 10 MG tablet TAKE 1 TABLET BY MOUTH AT BEDTIME 7/31/2019 at Unknown time Yes Julee Jauregui APRN CNP   cloNIDine (CATAPRES) 0.1 MG tablet Take 0.1 mg by mouth 2 times daily 8/1/2019 at am Yes Reported, Patient   famotidine (PEPCID) 20 MG tablet TAKE 1 TABLET BY MOUTH ONCE DAILY 7/31/2019 at hs Yes Julee Jauregui APRN CNP   gabapentin (NEURONTIN) 100 MG capsule Take 100 mg by mouth 2 times daily 8/1/2019 at am Yes Reported, Patient   melatonin 3 MG tablet TAKE TWO TABLETS (6MG) BY MOUTH AT BEDTIME 7/31/2019 at Unknown time Yes Julee Jauregui APRN CNP   metoprolol tartrate (LOPRESSOR) 25 MG tablet TAKE 1 TABLET BY MOUTH TWICE DAILY 8/1/2019 at am Yes Julee Jauregui APRN CNP   sertraline (ZOLOFT) 50 MG tablet Take 50 mg by mouth daily 8/1/2019 at am Yes Reported, Patient            Allergies:     Allergies   Allergen Reactions     Azithromycin Diarrhea     Fenofibrate      Gemfibrozil      Levaquin [Levofloxacin] GI Disturbance     Penicillin G      Rosuvastatin      Vytorin      Bextra [Valdecoxib] Rash     Diltiazem Rash     Hydrochlorothiazide Rash     Sulfa Antibiotics [Sulfa Drugs] Rash     Verapamil Rash            Social History:     Social History     Tobacco Use     Smoking status: Never Smoker     Smokeless tobacco: Never Used   Substance Use  "Topics     Alcohol use: Never     Frequency: Never             Family History:     Family History   Problem Relation Age of Onset     Colon Cancer Mother          in 70's     Cerebrovascular Disease Father          of stroke in 50's     Hypertension Brother      Coronary Artery Disease Brother      Hypertension Brother      Coronary Artery Disease Brother               Review of Systems:   The 10 point Review of Systems is negative other than as noted in the HPI.           Physical Exam:   Blood pressure (!) 145/91, pulse 75, temperature 98.2  F (36.8  C), resp. rate 16, height 1.651 m (5' 5\"), weight 57.1 kg (125 lb 15.7 oz), SpO2 98 %.  125 lbs 15.68 oz      GENERAL: Pleasant and cooperative. No acute distress.  EYES: Pupils equal and round. No scleral erythema or icterus.  ENT: External ears are normal without deformity. Posterior oropharynx is without erythem, swelling, or exudate.  NECK: Supple. No masses or swelling. No tenderness. Thyroid is normal without mass or tenderness.  CHEST: Clear to auscultation. Normal breath sounds. No retractions.   CV: Regular rate and rhythm. No JVD. Pulses normal.  ABDOMEN: Bowel sounds present. No tenderness. No masses or hernia.  EXTREMETIES: No clubbing, cyanosis, or ischemia. Pain with palpation of right lateral hip and buttock. No pain with passive ROM of leg.  SKIN: Warm and dry to touch. No wounds or rashes.  NEUROLOGIC: Strength and sensation are normal. Deep tendon reflexes are normal. Cranial nerves are normal.             Data:   All new lab and imaging data was reviewed.     Results for orders placed or performed during the hospital encounter of 19 (from the past 24 hour(s))   CT Hip Right w/o Contrast    Narrative    CT RIGHT HIP WITHOUT CONTRAST  2019 4:10 PM     HISTORY: Right hip pain after fall 2 weeks ago.    TECHNIQUE: Axial scans were performed through the pelvis with sagittal  and coronal reconstruction. Radiation dose for this scan was " reduced  using automated exposure control, adjustment of the mA and/or kV  according to patient size, or iterative reconstruction technique.    COMPARISON: None.    FINDINGS: Moderate bilateral hip degenerative changes, slightly  greater on the right than the left. No evidence of acute fracture or  subluxation. No evidence of sacral fracture. Pubic rami are intact. No  acetabular fracture. Degenerative changes in the lower lumbar spine.      Impression    IMPRESSION: No evidence of acute fracture or subluxation. Moderate  bilateral hip degenerative changes.    LINDEN AMBROCIO MD

## 2019-08-02 NOTE — H&P
"[facility]    History and Physical - Hospitalist Service       Date of Admission:  (Not on file)    Assessment & Plan   Vanda Banks is a 88 year old female admitted w/ PMH of hypertension, depression, hemorrhagic stroke in September 2019, osteoarthritis, SVT status post ablation, GERD,on (Not on file). She ***    recent appearing nondisplaced unstable L5 fracture, likely traumatic 2/2 mechanical fall.  She has baseline difficulty with urinary incontinence there is been no complaints of bowel incontinence.  --consult neurosurgery for possible surgical intervention  --*** for analgesia in addition to baclofen & neurontin  --bedrest until seen by neurosurgery  --RCRI score is ***.  *** Diagnostics are recommended before proceeding w/ surgery if needed.    Hypertension  --PTA amlodipine, clonidine, metoprolol    Depression  --continue PTA zoloft    hemorrhagic stroke in September 2018    Osteoarthritis    SVT status post ablation  --continue PTA metoprolol    GERD  ***     Diet: ***  DVT Prophylaxis: {DVT PROPHYLAXIS:593721}  Nina Catheter: not present  Code Status: ***    Disposition Plan   Expected discharge: {estimated:915190}, recommended to {Expected disposition location:979125} once {DC Goals:542123}.  Entered: ONEL Carlos CNP 08/02/2019, 2:01 PM     The patient's care was discussed with the { :068376}.    ONEL Carlos CNP  [facility]    ______________________________________________________________________    Chief Complaint   ***    {History obtained from:5427671::\"History is obtained from the patient\"}    History of Present Illness   Vanda Banks is a 88 year old female with past history of hypertension, depression, hemorrhagic stroke in September 2018, osteoarthritis, SVT status post ablation, GERD, who sustained a mechanical fall 2 weeks prior to admission on the bathroom at her assisted living facility.  2 days after the fall she complained of right " "lateral hip pain that radiated down her right lateral side.  She was evaluated at [facility] urgent care with x-ray showing no fracture.  She was discharged home her symptoms progressed over the following week.  On 8/1/2019 she had a follow-up appointment at [facility] she was having difficulty ambulating secondary to severe pain.  She was referred to the emergency department where she underwent CT imaging of the right hip without contrast that was negative for fracture but did show mild bilateral hip degenerative changes.  She was registered to observation.  Because of ongoing inability to ambulate she underwent MRI of the lumbar spine which showed a recent appearing nondisplaced unstable L5 fracture.  She was seen in consultation by neurosurgery who recommended transfer to [facility] for possible surgical intervention.    Review of Systems    {For notewriter, delete & use Media [employer] \"Top[employer]Photo\":929595}    Past Medical History    I have reviewed this patient's medical history and updated it with pertinent information if needed.   Past Medical History:   Diagnosis Date     Cerebral infarction (H)      Chronic osteoarthritis      Depressive disorder      Hypertension    Hemorrhagic stroke 9/2018  SVT status post ablation  GERD    Past Surgical History   I have reviewed this patient's surgical history and updated it with pertinent information if needed.  Past Surgical History:   Procedure Laterality Date     BACK SURGERY       EP ABLATION SVT         Social History   I have reviewed this patient's social history and updated it with pertinent information if needed.  Social History     Tobacco Use     Smoking status: Never Smoker     Smokeless tobacco: Never Used   Substance Use Topics     Alcohol use: Never     Frequency: Never     Drug use: Never       Family History   I have reviewed this patient's family history and updated it with pertinent information if needed.   Family History   Problem Relation Age of Onset     Colon " "Cancer Mother          in 70's     Cerebrovascular Disease Father          of stroke in 50's     Hypertension Brother      Coronary Artery Disease Brother      Hypertension Brother      Coronary Artery Disease Brother    ***    Prior to Admission Medications   Cannot display prior to admission medications because the patient has not been admitted in this contact.     Allergies   Allergies   Allergen Reactions     Azithromycin Diarrhea     Fenofibrate      Gemfibrozil      Levaquin [Levofloxacin] GI Disturbance     Penicillin G      Rosuvastatin      Vytorin      Bextra [Valdecoxib] Rash     Diltiazem Rash     Hydrochlorothiazide Rash     Sulfa Antibiotics [Sulfa Drugs] Rash     Verapamil Rash       Physical Exam   Vital Signs:                    Weight: 0 lbs 0 oz    {OPTIONAL -- recommend using personal SmartPhrase or Notewriter for exam    :622314}    Data   Data reviewed today: I reviewed all medications, new labs and imaging results over the last 24 hours. I personally reviewed {Choose images/EKG's you personally looked at today:018613::\"no images or EKG's today\"}.    {OPTIONAL -- Collapsible Data:910867}  "

## 2019-08-02 NOTE — PLAN OF CARE
PRIMARY DIAGNOSIS: ACUTE PAIN  OUTPATIENT/OBSERVATION GOALS TO BE MET BEFORE DISCHARGE:  1. Pain Status: Improved-controlled with oral pain medications.    2. Return to near baseline physical activity: No    3. Cleared for discharge by consultants (if involved): No    Discharge Planner Nurse   Safe discharge environment identified: Pt comes from [facility]   Barriers to discharge: Yes       Entered by: Francesca Galvez 08/01/2019 9:30 PM     Pt A&Ox4, VSS. Assist x2, unable to bear weight at this time. Purewick in place. Pain controlled with scheduled tylenol, voltaren gel ordered. Tolerating regular diet, denies nausea. MRI checklist completed, MRI to be done in AM. Ortho and PT consulted. Will continue to monitor.   Temp: 98.2  F (36.8  C) Temp src: Temporal BP: (!) 145/91 Pulse: 75 Heart Rate: 76 Resp: 16 SpO2: 98 % O2 Device: None (Room air)    Please review provider order for any additional goals.   Nurse to notify provider when observation goals have been met and patient is ready for discharge.

## 2019-08-02 NOTE — PROGRESS NOTES
Patient was fit with an lso.  Nursing present as well as daughter.  Wear, care instructions given.  Please call orthotics if questions.  Patient requested I leave the orthosis on because it makes her feel better.  Zachery CORTEZ.

## 2019-08-02 NOTE — PLAN OF CARE
"  [place]bert: A&O  VS: /68 (BP Location: Left arm)   Pulse 73   Temp 97.6  F (36.4  C) (Oral)   Resp 18   Ht 1.651 m (5' 5\")   Wt 57.1 kg (125 lb 15.7 oz)   SpO2 99%   BMI 20.96 kg/m    LS: clear on RA, denies SOB  GI: active, last BM 8/1, denies nausea  : purewick in place, new purewick placed this am  Skin: intact, scabs and bruises  Activity: x2, brace when OOB  Diet: reg, tolerating   Pain: c/o 2-7/10 right hip pain, taking sched tylenol, voltaren gel, lidocaine patch in place.  Lab: MRI complete, shows L5 unstable fx, CT complete   Plan: Neurosurg, orthosis, SW, PT.     *pt has no IV access, explained to her importance of having IV access, pt refused.    "

## 2019-08-03 ENCOUNTER — APPOINTMENT (OUTPATIENT)
Dept: PHYSICAL THERAPY | Facility: CLINIC | Age: 84
DRG: 552 | End: 2019-08-03
Attending: PHYSICIAN ASSISTANT
Payer: MEDICARE

## 2019-08-03 PROCEDURE — 25000132 ZZH RX MED GY IP 250 OP 250 PS 637: Mod: GY | Performed by: INTERNAL MEDICINE

## 2019-08-03 PROCEDURE — 12000011 ZZH R&B MS OVERFLOW

## 2019-08-03 PROCEDURE — 99233 SBSQ HOSP IP/OBS HIGH 50: CPT | Performed by: PHYSICIAN ASSISTANT

## 2019-08-03 PROCEDURE — 97162 PT EVAL MOD COMPLEX 30 MIN: CPT | Mod: GP

## 2019-08-03 PROCEDURE — 97530 THERAPEUTIC ACTIVITIES: CPT | Mod: GP

## 2019-08-03 RX ADMIN — FAMOTIDINE 20 MG: 20 TABLET ORAL at 21:34

## 2019-08-03 RX ADMIN — GABAPENTIN 200 MG: 100 CAPSULE ORAL at 19:42

## 2019-08-03 RX ADMIN — BACLOFEN 10 MG: 10 TABLET ORAL at 21:34

## 2019-08-03 RX ADMIN — GABAPENTIN 200 MG: 100 CAPSULE ORAL at 07:57

## 2019-08-03 RX ADMIN — DICLOFENAC 2 G: 10 GEL TOPICAL at 19:53

## 2019-08-03 RX ADMIN — ACETAMINOPHEN 975 MG: 325 TABLET, FILM COATED ORAL at 19:42

## 2019-08-03 RX ADMIN — CLONIDINE HYDROCHLORIDE 0.1 MG: 0.1 TABLET ORAL at 19:42

## 2019-08-03 RX ADMIN — CLONIDINE HYDROCHLORIDE 0.1 MG: 0.1 TABLET ORAL at 07:58

## 2019-08-03 RX ADMIN — ACETAMINOPHEN 975 MG: 325 TABLET, FILM COATED ORAL at 13:43

## 2019-08-03 RX ADMIN — LIDOCAINE 1 PATCH: 560 PATCH PERCUTANEOUS; TOPICAL; TRANSDERMAL at 08:04

## 2019-08-03 RX ADMIN — AMLODIPINE BESYLATE 5 MG: 5 TABLET ORAL at 07:58

## 2019-08-03 RX ADMIN — ACETAMINOPHEN 975 MG: 325 TABLET, FILM COATED ORAL at 07:57

## 2019-08-03 RX ADMIN — DICLOFENAC 2 G: 10 GEL TOPICAL at 15:42

## 2019-08-03 RX ADMIN — GABAPENTIN 200 MG: 100 CAPSULE ORAL at 13:42

## 2019-08-03 RX ADMIN — DICLOFENAC 2 G: 10 GEL TOPICAL at 08:00

## 2019-08-03 RX ADMIN — METOPROLOL TARTRATE 25 MG: 25 TABLET, FILM COATED ORAL at 19:42

## 2019-08-03 RX ADMIN — DICLOFENAC 2 G: 10 GEL TOPICAL at 11:41

## 2019-08-03 RX ADMIN — Medication 2.5 MG: at 13:46

## 2019-08-03 RX ADMIN — METOPROLOL TARTRATE 25 MG: 25 TABLET, FILM COATED ORAL at 07:58

## 2019-08-03 RX ADMIN — MELATONIN TAB 3 MG 6 MG: 3 TAB at 21:34

## 2019-08-03 RX ADMIN — SERTRALINE HYDROCHLORIDE 75 MG: 50 TABLET ORAL at 07:57

## 2019-08-03 NOTE — CONSULTS
Care Transition Initial Assessment - RAZ     Met with: Patient and Family - pt's dtr Dafne was present.  Active Problems:    Hip pain, right    Right hip pain    Fracture of lumbar spine without lesion of spinal cord (H)       DATA  Lives With: facility resident - [facility]     Quality of Family Relationships: helpful, involved, supportive  Description of Support System: Supportive, Involved  Who is your support system?: Children  Support Assessment: Adequate family and caregiver support. Pt has a strong support system with her family.  Her dtr Dafne is a nurse practitioner, so she is a good advocate.  Identified issues/concerns regarding health management: Pt was admitted for right hip pain.     Resources List: Skilled Nursing Facility     Quality of Family Relationships: helpful, involved, supportive     ASSESSMENT  Cognitive Status:  awake, alert and oriented  Concerns to be addressed: Pt was laying in bed speaking with her dtr Dafne at the bedside.  Pt was in a pleasant mood and mostly contributed to the conversation.  Dafne did a lot of the talking.  Pt and Dafne are frustrated because the reason she was admitted was because the staff at [facility] were not appropriately supporting her and she fell.  The pt is an assist X1 for all mobility at the facility.  [facility] helps with dressing, bathing, sets-up the pt's medications, and the pt gets 3 meals/day.    Pt is aware that the current discharge recommendation is TCU and she is agreeable.  Raz informed the pt and Dafne that she will need a qualifying stay for the pt's insurance to pay for it.  The pt was switched from OBS to IP on 8/2.  Dafne [facility].       PLAN  Financial costs for the patient includes possible TCU stay if there is not a qualifying stay.  Transportation/person available to transport on day of discharge  is undetermined at this time.    Raz will continue with discharge planning and will be  available as needed until discharge.    TCU referrals were sent.

## 2019-08-03 NOTE — PROGRESS NOTES
08/03/19 1122   Quick Adds   Type of Visit Initial PT Evaluation   Living Environment   Lives With facility resident   Living Environment Comment Pt lives in [facility], A x 1 at all times at [facility] mobility   Self-Care   Usual Activity Tolerance moderate   Current Activity Tolerance poor   Activity/Exercise/Self-Care Comment A x 1 with self cares, sits to shower    Functional Level Prior   Ambulation 3-->assistive equipment and person   Transferring 3-->assistive equipment and person   Toileting 3-->assistive equipment and person   Bathing 3-->assistive equipment and person   Communication 0-->understands/communicates without difficulty   Fall history within last six months yes   Number of times patient has fallen within last six months 4   Which of the above functional risks had a recent onset or change? ambulation;transferring;toileting;bathing;dressing;fall history   Prior Functional Level Comment Pt is A x 1 with use of hemiwalker RUE. Had stroke 9/2018, L residual weakness, AFO LLE when ambulating   General Information   Onset of Illness/Injury or Date of Surgery - Date 08/01/19   Referring Physician Radha Williamson PA-C   Pertinent History of Current Problem (include personal factors and/or comorbidities that impact the POC) Vanda Banks is a 88 year old female with history of hypertension, depression, hemorrhagic stroke last September, osteoarthritis, EP ablation for SVT, GERD, depression, and previous back surgery.  She presented to the emergency department from orthopedic surgery clinic ([facility]).  About 2 weeks ago she had a mechanical fall while walking to the bathroom at her assisted living facility.  Initially, she did not really have any pain.  2 days later she began to have pain in her right lateral hip that radiated down her right lateral thigh.  She went to urgent care at [facility].  She had an x-ray of her right hip that showed no fracture.  She went home.  Over the  next week her symptoms progressed.  She went back to [facility] for follow-up.  She was not really able to walk on her own.  Her pain was significant.  She was referred to the emergency department for evaluation.  Non-contrast CT of the right hip without contrast was obtained and showed no fracture.  It did show mild bilateral hip degenerative changes.  She was unable to ambulate and admitted.   Precautions/Limitations fall precautions;spinal precautions   General Observations Brace all times when OOB    Cognitive Status Examination   Orientation orientation to person, place and time   Pain Assessment   Patient Currently in Pain Yes, see Vital Sign flowsheet  (radiating pain down RLE with movement )   Posture    Posture Forward head position   Posture Comments Sitting EOB requires mod A x 1, strong L lateral lean due to inc pain with WB through R pelvis.    Range of Motion (ROM)   ROM Comment BLE WFL, with increased effort pt able to perofrm LUE ROM WFL when supine    Strength   Strength Comments LLE/LUE 3/5 strength grossly. Able to perform LLE DF against gravity with effort. RLE > 3/5 strength.    Bed Mobility   Bed Mobility Comments Supine > sit with max A x 2   Transfer Skills   Transfer Comments STS with platform walker and max A x 2, therapist on L side, assist to place LUE on platform off walker, continues to demonstrate L lateral lean, unable to tolerate much WB through RLE    Gait   Gait Comments not initatied    Sensory Examination   Sensory Perception Comments Pt reports R hip pain, radiating symptoms R lateral thigh, with movement radiating symptoms into R ft. Pt reports when supine sensation to light touch equal bilaterally. Pt reported increased numbness R plantar aspect of foot with sitting and standing EOB. Improved when returned to supine however cont to report tingling in R great toe    Coordination   Coordination Comments Residual impaired LUE coordination 2/2  stroke   General  "Therapy Interventions   Planned Therapy Interventions bed mobility training;balance training;gait training;neuromuscular re-education;strengthening;transfer training   Clinical Impression   Criteria for Skilled Therapeutic Intervention yes, treatment indicated   PT Diagnosis impaired functional mobility from baseline   Influenced by the following impairments PMH, L sided weakness from prior stroke, pain, radiating symptoms down RLE, impaired balance, weakness, posture   Functional limitations due to impairments DecreasedIND with functional mobility    Clinical Presentation Evolving/Changing   Clinical Presentation Rationale A x 2, PMH, clinical judgement    Clinical Decision Making (Complexity) Moderate complexity   Therapy Frequency 5x/week   Predicted Duration of Therapy Intervention (days/wks) 1 week   Anticipated Discharge Disposition Transitional Care Facility   Risk & Benefits of therapy have been explained Yes   Patient, Family & other staff in agreement with plan of care Yes   [facility] AM-PAC  \"6 Clicks\" V.2 Basic Mobility Inpatient Short Form   1. Turning from your back to your side while in a flat bed without using bedrails? 2 - A Lot   2. Moving from lying on your back to sitting on the side of a flat bed without using bedrails? 2 - A Lot   3. Moving to and from a bed to a chair (including a wheelchair)? 1 - Total   4. Standing up from a chair using your arms (e.g., wheelchair, or bedside chair)? 2 - A Lot   5. To walk in hospital room? 1 - Total   6. Climbing 3-5 steps with a railing? 1 - Total   Basic Mobility Raw Score (Score out of 24.Lower scores equate to lower levels of function) 9   Total Evaluation Time   Total Evaluation Time (Minutes) 15     "

## 2019-08-03 NOTE — PLAN OF CARE
"  [place]: A&O  Left side defecits. Right great toe numbness, provider aware.  VS: /70 (BP Location: Left arm)   Pulse 73   Temp 97.3  F (36.3  C) (Oral)   Resp 16   Ht 1.651 m (5' 5\")   Wt 57.1 kg (125 lb 15.7 oz)   SpO2 96%   BMI 20.96 kg/m    LS: clear on RA, denies SOB.  GI: active, had BM today, bed pan, denies nausea  : purewick in place  Skin: intact  Activity: x2, brace when OOB, lift, walker, pt sat on side of bed, unable to stand  Diet: reg   Pain: c/o 5-6/10 right hip/leg pain, taking sched tylenol, lidocaine patch, voltaren gel, PRN oxy x1.  Plan: Neurosurg, PT, SW. Awaiting TCU placement.           "

## 2019-08-03 NOTE — PLAN OF CARE
"Neuro: Alert and oriented x4  Vitals: /75 (BP Location: Left arm)   Pulse 73   Temp 97.6  F (36.4  C) (Oral)   Resp 12   Ht 1.651 m (5' 5\")   Wt 57.1 kg (125 lb 15.7 oz)   SpO2 97%   BMI 20.96 kg/m    Pain: Reports 3/10 back pain; scheduled medications given. Lidocaine patch rescheduled per patient request. Patient reports pain is exacerbated with movement but tolerable with rest.   Respiratory: WNL, lung sounds clear throughout   Cardiac: WNL  Peripheral Neurovascular: WNL, denies numbness and tingling  Gastrointestinal: Denies GI symptoms. Tolerating regular diet,  Genitourinary: WNL, purewick in place overnight  Skin: Bruising noted, scab on left knee  Musculoskeletal: Up with lift and assist of 2; PT consult   Plan to assist with supportive cares and pain management. Discharge pending PT consult.   "

## 2019-08-03 NOTE — PLAN OF CARE
Discharge Planner PT   Patient plan for discharge: rehab  Current status:     Eval complete, treatment indicated. Pt's dtr present and supportive, pt's dtr is NP     Edu PT role, POC, spine precautions. Brace on when OOB. Pt had brace on when supine upon PT arrival.     Engaged in gentle supine exercises. Supine > sit max A x 2. Demo strong L lean sitting EOB due to inc pain with WB through R LE in sitting. Mod Ax 1 to maintain sitting balance and assist to position LUE on bed. STS with platform walker with max A x 2, assist to place LUE on platform walker. Pt demo min WB through RLE. Increased pain reported. Standing tolerance 30 seconds. Sit > supine total A x 2    OVerall Pt reports pain well managed when supine at rest. Increased R hip pain with radiating symptoms into R lateral thigh and into R ft with movement.     Pt reports when supine sensation to light touch equal bilaterally. Pt reported increased numbness R plantar aspect of foot with sitting and standing EOB.     Pain improved and decreased radiating symptoms when returned to supine however cont to report tingling in R great toe     Barriers to return to prior living situation: A x 2, unable to ambulate, poor sitting and standing balance. LLE/UE residual weakness from stroke. RLE pain    Recommendations for discharge: TCU  Rationale for recommendations: Pt will benefit from continued skilled PT at TCU to improve strength, balance, gait, endurance to improve functional mobility as pt currently far below baseline at this time          Entered by: Manuela Ellis 08/03/2019 11:29 AM

## 2019-08-03 NOTE — PROGRESS NOTES
Brace delivered yesterday.  CT did not clearly show the fracture.  OK to be OOB with brace and work with therapy.  OK for discharge when pain controlled.  Outpatient follow up with neurosurgery in 6 weeks.

## 2019-08-03 NOTE — PROGRESS NOTES
[facility]    Medicine Progress Note - Hospitalist Service       Date of Admission:  8/1/2019  Assessment & Plan   Vanda Banks is a 88 year old female with history of hypertension, depression, hemorrhagic stroke last September, osteoarthritis, EP ablation for SVT, GERD, depression, and previous back surgery.  She presented to the emergency department from orthopedic surgery clinic ([facility]).  About 2 weeks ago she had a mechanical fall while walking to the bathroom at her assisted living facility.  Initially, she did not really have any pain. 2 days later she began to have pain in her right lateral hip that radiated down her right lateral thigh. She went to urgent care at [facility].  She had an x-ray of her right hip that showed no fracture and went home. Over the next week her symptoms progressed.  She went back to [facility] for follow-up.  She was not really able to walk on her own. Her pain was significant. She was referred to the emergency department for evaluation. Non-contrast CT of the right hip without contrast was obtained and showed no fracture.  It did show mild bilateral hip degenerative changes. She was unable to ambulate and admitted. MRI of lumbar spine showed unstable L5 fracture and neurosurgery was consulted.     Right Hip Pain  L5 Fracture, Unstable -   - continue Tylenol, Neurontin, oxycodone, Voltaren gel, and Lidoderm patches will be available for use as needed.    - Fitted for TLSO brace, tolerating well  - PT recommends TCU, SWS following, awaiting placement  - F/u with neurosurgery clinic in 6 weeks     Depression -  pt family reported they were concerned about her depression on admission and her prior to admission Zoloft was increased from 50 mg to 75 mg daily.     Hypertension -  Continue prior to admission amlodipine, clonidine, and metoprolol (all with hold parameters).     Hemorrhagic stroke - related to hypertension in  9/18.     Diet: Regular Diet Adult    DVT Prophylaxis: Pneumatic Compression Devices  Nina Catheter: not present  Code Status: DNR      Disposition Plan   Expected discharge: Tomorrow, recommended to transitional care unit once adequate pain management/ tolerating PO medications.  Entered: Mya Olsen PA-C 08/03/2019, 2:00 PM       The patient's care was discussed with the Patient and Patient's Family.    Mya Olsen PA-C  Hospitalist Service  [facility]    ______________________________________________________________________    Interval History   Reports worsening Rt hip pain following PT session, took oxycodone just prior to my arrival at bedside. Continues to have intermittent numbness/tingling sensation down RLE. No LBP. No incontinence.    Data reviewed today: I reviewed all medications, new labs and imaging results over the last 24 hours. I personally reviewed no images or EKG's today.    Physical Exam   Vital Signs: Temp: 97.5  F (36.4  C) Temp src: Oral BP: 122/71 Pulse: 73 Heart Rate: 63 Resp: 16 SpO2: 99 % O2 Device: None (Room air)    Weight: 125 lbs 15.68 oz     GENERAL: appears stated age, no acute distress  PSYCH: pleasant, cooperative  EYES: no discharge, no injection  HENT:  Normocephalic. Moist mucus membranes.  NECK:  Supple, symmetric  EXTREMITIES:  No gross deformities, moves all 4 limbs spontaneously  SKIN:  Warm and dry, no rash or suspicious lesions    NEUROLOGIC: alert, sensation grossly intact.    Data   Recent Labs   Lab 08/01/19  2300   WBC 13.3*   HGB 13.8   MCV 88         POTASSIUM 3.9   CHLORIDE 110*   CO2 24   BUN 34*   CR 0.92   ANIONGAP 5   SHEN 8.3*   *     Recent Results (from the past 24 hour(s))   CT Lumbar Spine w/o Contrast    Narrative    CT OF THE LUMBAR SPINE WITHOUT CONTRAST  8/2/2019 3:06 PM     COMPARISON: Lumbar spine MR same day.    HISTORY: L/S-spine fracture, traumatic.     TECHNIQUE: Axial images of the lumbar spine  were acquired without  intravenous contrast. Multiplanar reformations were created from the  axial source images.        Impression    IMPRESSION:  The known nondisplaced fracture through the inferior  third of the L5 vertebral body is much more difficult to see on this  study than on the comparison MRI. No other fractures noted.  Degenerative grade 1 anterolisthesis of L4 upon L5 again noted.      Radiation dose for this scan was reduced using automated exposure  control, adjustment of the mA and/or kV according to patient size, or  iterative reconstruction technique    PRINCESS CRUZ MD

## 2019-08-04 VITALS
HEIGHT: 65 IN | RESPIRATION RATE: 16 BRPM | BODY MASS INDEX: 20.99 KG/M2 | SYSTOLIC BLOOD PRESSURE: 125 MMHG | TEMPERATURE: 97.7 F | OXYGEN SATURATION: 99 % | HEART RATE: 73 BPM | WEIGHT: 125.98 LBS | DIASTOLIC BLOOD PRESSURE: 68 MMHG

## 2019-08-04 PROCEDURE — 99207 ZZC MOONLIGHTING INDICATOR: CPT | Performed by: PHYSICIAN ASSISTANT

## 2019-08-04 PROCEDURE — 25000132 ZZH RX MED GY IP 250 OP 250 PS 637: Mod: GY | Performed by: INTERNAL MEDICINE

## 2019-08-04 PROCEDURE — 99207 ZZC CDG-CODE CATEGORY CHANGED: CPT | Performed by: PHYSICIAN ASSISTANT

## 2019-08-04 PROCEDURE — 99239 HOSP IP/OBS DSCHRG MGMT >30: CPT | Performed by: PHYSICIAN ASSISTANT

## 2019-08-04 RX ORDER — LIDOCAINE 4 G/G
PATCH TOPICAL
Qty: 5 PATCH | Refills: 0 | DISCHARGE
Start: 2019-08-04 | End: 2019-08-08 | Stop reason: DRUGHIGH

## 2019-08-04 RX ORDER — OXYCODONE HYDROCHLORIDE 5 MG/1
2.5 TABLET ORAL
Qty: 10 TABLET | Refills: 0 | Status: SHIPPED | DISCHARGE
Start: 2019-08-04 | End: 2019-08-08 | Stop reason: DRUGHIGH

## 2019-08-04 RX ORDER — AMOXICILLIN 250 MG
1 CAPSULE ORAL 2 TIMES DAILY PRN
DISCHARGE
Start: 2019-08-04 | End: 2019-08-08 | Stop reason: DRUGHIGH

## 2019-08-04 RX ORDER — ACETAMINOPHEN 325 MG/1
975 TABLET ORAL 3 TIMES DAILY
Status: ON HOLD | DISCHARGE
Start: 2019-08-04 | End: 2020-06-19

## 2019-08-04 RX ADMIN — DICLOFENAC 2 G: 10 GEL TOPICAL at 13:08

## 2019-08-04 RX ADMIN — SERTRALINE HYDROCHLORIDE 75 MG: 50 TABLET ORAL at 08:10

## 2019-08-04 RX ADMIN — DICLOFENAC 2 G: 10 GEL TOPICAL at 16:10

## 2019-08-04 RX ADMIN — DICLOFENAC 2 G: 10 GEL TOPICAL at 08:11

## 2019-08-04 RX ADMIN — METOPROLOL TARTRATE 25 MG: 25 TABLET, FILM COATED ORAL at 08:10

## 2019-08-04 RX ADMIN — LIDOCAINE 1 PATCH: 560 PATCH PERCUTANEOUS; TOPICAL; TRANSDERMAL at 08:11

## 2019-08-04 RX ADMIN — AMLODIPINE BESYLATE 5 MG: 5 TABLET ORAL at 08:10

## 2019-08-04 RX ADMIN — GABAPENTIN 200 MG: 100 CAPSULE ORAL at 14:09

## 2019-08-04 RX ADMIN — ACETAMINOPHEN 975 MG: 325 TABLET, FILM COATED ORAL at 08:10

## 2019-08-04 RX ADMIN — GABAPENTIN 200 MG: 100 CAPSULE ORAL at 08:11

## 2019-08-04 RX ADMIN — CLONIDINE HYDROCHLORIDE 0.1 MG: 0.1 TABLET ORAL at 08:11

## 2019-08-04 RX ADMIN — ACETAMINOPHEN 975 MG: 325 TABLET, FILM COATED ORAL at 14:09

## 2019-08-04 NOTE — PLAN OF CARE
"  [place]: A&O, forgetful  Reports right foot numbness  VS: /71 (BP Location: Right arm)   Pulse 73   Temp 97.8  F (36.6  C) (Oral)   Resp 18   Ht 1.651 m (5' 5\")   Wt 57.1 kg (125 lb 15.7 oz)   SpO2 96%   BMI 20.96 kg/m    LS: clear on RA, denies SOB  GI: active, last BM today, denies nausea  : purewick in place  Skin: intact, bruises and scabs  Activity: x2, left, GB, walker, brace when OOB  Diet: reg   Pain: c/o 6-8/right hip pain, taking sched tylenol, voltaren gel and lidocaine patch  Plan: PT, pain control, neurosurgery, discharging to TCU, daughter transport           "

## 2019-08-04 NOTE — PROGRESS NOTES
RN CTS notified that patient was appealing discharge with Medicare. Received notice from [facility] of patient's appeal. Met with patient and daughter June to review and sign Detailed Notice of Discharge. Upon review, patient and daughter decided to cancel appeal of discharge.  Updated provider, charge RN, and SW of change.  left for admissions at [facility] [phone] to inquire if patient bed still available. Awaiting return call. Patient agreeable to discharging to TCU today if bed still available.     Contact  for further discharge planning or needs.    Barbi Pelaez MA-RN  Care Transition Services  [phone]

## 2019-08-04 NOTE — DISCHARGE SUMMARY
[facility]  Hospitalist Discharge Summary       Date of Admission:  8/1/2019  Date of Discharge:  8/4/2019  Discharging Provider: RAJIV Hoffmann / Sharif Flood MD    Discharge Diagnoses   1. Right hip pain related to #2  2. Unstable L5 fracture  3. Hypertension  4. H/o hemorrhagic stroke    Follow-ups Needed After Discharge   1.  Follow-up with Neurosurgery in 6 weeks with imaging prior.     2.  Follow-up and recommended per Neurosurgery:  - continue to wear LSO brace at all times when out of bed  - Repeat lumbar CT in 6 weeks (order placed in Epic)  - Return to clinic following repeat CT spine in 6 weeks   - Call the Spine and Brain clinic for any questions or concerns during   interim, at [phone]  - Seek medical attention immediately if any paresthesias, weakness, gait   instability, bowel/bladder incontinence, new pain.        Discharge Disposition   Discharged to short-term care facility  Condition at discharge: Stable    Hospital Course   Vanda Banks is a 88 year old female with history of hypertension, depression, hemorrhagic stroke last September, osteoarthritis, EP ablation for SVT, GERD, depression, and previous back surgery.  She presented to [facility] from [facility] clinic on 8/1/2019 for hip pain and inability to ambulate.  About 2 weeks ago, Ms Banks had a mechanical fall while walking to the bathroom at her assisted living facility.  Initially, she did not really have any pain however two days later she began to have pain in her right hip radiating down her right lateral thigh. She was seen at [facility] where x-rays of the right hip that showed no fracture.  She returned home.  Unfortunately, symptoms and pain continued worsen over the course of the week. She returned to [facility] for follow-up.  Because she was not really able to walk on her own due to severe pain, she was referred to the emergency department for  evaluation.   In the ED, a non-contrast CT of the right hip showed mild bilateral hip degenerative changes but no fracture.  Because patient remained very painful and was unable to return to prior living arrangement due to inability to walk, she was admitted to Observation for evaluation and management.  MRI Lumbar Spine showed unstable L5 fracture.  Neurosurgery consulted.     Right Hip Pain  L5 Fracture, Unstable   MRI Lumbar Spine revealed a nondisplaced L5 fracture through the inferior third of the vertebral body not involving the posterior column.  However, because the fracture extends the entire AP diameter of the vertebrae, it is considered unstable.  Neurosurgery consulted and, as patient neuro intact, recommended conservative management.  TLSO brace ordered and fitted.  Pain managed with tylenol, Neurontin, oxycodone, Voltaren gel, and Lidoderm patches.  Patient has been instructed to wear brace at all times when up and out of bed.  Patient continues to be very painful and unable to ambulate.  She will discharge to TCU with outpatient Neurosurgery follow-up in 6 weeks.     Depression   Prior to admission Zoloft initially increased at family's request (worried about worsening depression), however in light of multiple pain medications that could cause some sedation and possible psychiatric effects, will leave patient on her PTA dose.  She can follow-up with her PMD as an outpatient to adjust this medication.      Hypertension  Prior to admission amlodipine, clonidine, and metoprolol continued.     Hemorrhagic stroke with residual deficits, stable.    Code Status: DNR      Consultations This Hospital Stay   PHYSICAL THERAPY ADULT IP CONSULT  ORTHOPEDIC SURGERY IP CONSULT  NEUROSURGERY IP CONSULT  ORTHOSIS SPINAL IP CONSULT  SOCIAL WORK IP CONSULT    Code Status   DNR    Time Spent on this Encounter   I, RAJIV Hoffmann, personally saw the patient today and spent greater than 30 minutes discharging this  patient.     Barbi Arreguin, PAC  [facility]  ______________________________________________________________________    Physical Exam   Vital Signs: Temp: 97.7  F (36.5  C) Temp src: Oral BP: 114/66   Heart Rate: 66 Resp: 16 SpO2: 95 % O2 Device: None (Room air)    Weight: 125 lbs 15.68 oz  GENERAL:  Pleasant, cooperative.  Sleepy, laying in left lateral decubitus position.  Thin, frail appearing  PULMONOLOGY: Clear to auscultation bilaterally, diminished in dependent portion  CARDIAC: Regular rate and rhythm at present  MUSCULOSKELETAL:  Moving x 4.  No edema.  Strength intact in bilateral flexors and extensors of legs  NEURO: Alert and oriented to self and place.  No new focal deficits        Primary Care Physician   Julee Jauregui    Discharge Orders      CT lumbar spine without contrast     Follow-up and recommended labs and tests     - continue to wear LSO at all times when out of bed  - Repeat lumbar CT in 6 weeks.   - Return to clinic following repeat CT spine in 6 weeks   - Call the Spine and Brain clinic for any questions or concerns during interim, at [phone]  - Seek medical attention immediately if any paresthesias, weakness, gait instability, bowel/bladder incontinence, new pain.     General info for SNF    Length of Stay Estimate: Short Term Care: Estimated # of Days <30  Condition at Discharge: Stable  Level of care:skilled   Rehabilitation Potential: Good  Admission H&P remains valid and up-to-date: Yes  Recent Chemotherapy: N/A  Use Nursing Home Standing Orders: Yes     Mantoux instructions    Give two-step Mantoux (PPD) Per Facility Policy Yes     Intake and output    Every shift     Daily weights    Call Provider for weight gain of more than 2 pounds per day or 5 pounds per week.     Follow Up and recommended labs and tests    Follow-up with Neurosurgery in 6 weeks with imaging prior.     Additional Discharge Instructions    Patient needs to wear brace whenever  upright in bed or out of bed.     Activity - Up with assistive device     Activity - Up with nursing assistance     Reason for your hospital stay    Admitted with right hip pain and found to have an unstable L5 fracture.  Seen by Neurosurgery who recommend conservative management - patient should wear a lumbar brace whenever upright or out of bed.     DNR (Do Not Resuscitate)    Discussed on admission with legal decision maker     Occupational Therapy Adult Consult    Evaluate and treat as clinically indicated.    Reason:  Unstable L5 fracture causing back and right hip pain, weakness, deconditioned state.     Physical Therapy Adult Consult    Evaluate and treat as clinically indicated.    Reason:  Unstable L5 fracture causing back and right hip pain, weakness, deconditioned state.     Fall precautions     Advance Diet as Tolerated    Follow this diet upon discharge:  Regular Diet Adult       Significant Results and Procedures   Most Recent 3 CBC's:  Recent Labs   Lab Test 08/01/19 2300 06/25/19   WBC 13.3* 10.5   HGB 13.8 13.8   MCV 88 86    306     Most Recent 3 BMP's:  Recent Labs   Lab Test 08/01/19 2300 06/25/19    140   POTASSIUM 3.9 3.7   CHLORIDE 110* 107   CO2 24 27   BUN 34* 22   CR 0.92 0.98   ANIONGAP 5 6   SHEN 8.3* 9.1   * 77   ,   Results for orders placed or performed during the hospital encounter of 08/01/19   CT Hip Right w/o Contrast    Narrative    CT RIGHT HIP WITHOUT CONTRAST  8/1/2019 4:10 PM     HISTORY: Right hip pain after fall 2 weeks ago.    TECHNIQUE: Axial scans were performed through the pelvis with sagittal  and coronal reconstruction. Radiation dose for this scan was reduced  using automated exposure control, adjustment of the mA and/or kV  according to patient size, or iterative reconstruction technique.    COMPARISON: None.    FINDINGS: Moderate bilateral hip degenerative changes, slightly  greater on the right than the left. No evidence of acute fracture  or  subluxation. No evidence of sacral fracture. Pubic rami are intact. No  acetabular fracture. Degenerative changes in the lower lumbar spine.      Impression    IMPRESSION: No evidence of acute fracture or subluxation. Moderate  bilateral hip degenerative changes.    LINDEN AMBROCIO MD   MR Lumbar Spine w/o Contrast    Narrative    MRI OF THE LUMBAR SPINE WITHOUT CONTRAST 8/2/2019 7:55 AM     COMPARISON: None    HISTORY: Radiculopathy, less than 6 weeks, no red flags, no prior  management; right lateral hip pain.    TECHNIQUE: Multiplanar, multisequence MRI images of the lumbar spine  were acquired without IV contrast.    FINDINGS: There are five lumbar-type vertebrae for the purposes of  this dictation.     There is a fracture line through the inferior third of the L5  vertebral body with associated surrounding bone marrow edema. The  fracture line does not definitely extend into the posterior elements  on either side but does extend through the entirety of the vertebral  body. No other fractures noted. Vertebral body heights and contours of  the lumbar spine are otherwise normal. There is moderate right convex  curvature of the lumbar spine centered at the L2 level. There is mild  right lateral listhesis of L2 upon L3 and of L3 upon L4. Alignment of  the lumbar vertebrae is otherwise normal.    There is loss of disc height, disc desiccation and posterior disc  bulging to varying degrees at all levels of the lumbar spine with the  exception of the relatively normal-appearing L5-S1 disc.    The tip of the conus medullaris is at the upper L2 level which is  within normal limits. There is no evidence for intrathecal  abnormality.     Level by level:     L1-L2: There is a circumferential disc bulge, circumferential endplate  spurring and mild facet arthropathy bilaterally. These findings result  in moderate left foraminal stenosis, mild right foraminal narrowing  and mild spinal canal narrowing.    L2-L3: There is a  circumferential disc bulge, circumferential endplate  spurring, severe facet arthropathy bilaterally and marked ligamentum  flavum thickening bilaterally. These findings result in moderate  spinal canal stenosis and moderate bilateral neural foraminal  stenosis.    L3-L4: There is a circumferential disc bulge, circumferential endplate  spurring, moderate facet arthropathy bilaterally and thickening of the  ligamentum flavum bilaterally. These findings result in moderate right  foraminal stenosis, mild-moderate left foraminal narrowing and mild  spinal canal narrowing.    L4-L5: There is a minimal circumferential disc bulge and moderate  facet arthropathy bilaterally. These findings result in mild right  foraminal narrowing but no spinal canal or left foraminal stenosis.    L5-S1: There is severe facet arthropathy bilaterally. There is mild  bilateral neural foraminal narrowing but no spinal canal stenosis.      Impression    IMPRESSION:  1. Recent-appearing nondisplaced fracture through the inferior third  of the L5 vertebral body that does not appear to involve the posterior  elements. However, given this fracture does encompass the entire AP  diameter of the vertebral body, it is considered an unstable fracture.  2. Diffuse degenerative changes of the lumbar spine as described  above.    This imaging study, including the abnormal finding of recent L5  fracture, was discussed with the attending physician, Dr. Echeverria at  [facility], by Dr. Pierce on 8/2/2019 9:00 AM.    PRINCESS PIERCE MD   CT Lumbar Spine w/o Contrast    Narrative    CT OF THE LUMBAR SPINE WITHOUT CONTRAST  8/2/2019 3:06 PM     COMPARISON: Lumbar spine MR same day.    HISTORY: L/S-spine fracture, traumatic.     TECHNIQUE: Axial images of the lumbar spine were acquired without  intravenous contrast. Multiplanar reformations were created from the  axial source images.        Impression    IMPRESSION:  The known nondisplaced fracture through the  inferior  third of the L5 vertebral body is much more difficult to see on this  study than on the comparison MRI. No other fractures noted.  Degenerative grade 1 anterolisthesis of L4 upon L5 again noted.      Radiation dose for this scan was reduced using automated exposure  control, adjustment of the mA and/or kV according to patient size, or  iterative reconstruction technique    PRINCESS CRUZ MD       Discharge Medications   Current Discharge Medication List      START taking these medications    Details   Lidocaine (LIDOCARE) 4 % Patch Apply 1-2 patches to right hip and/or back.  OK to cut to size.  To prevent lidocaine toxicity, patient should be patch free for 12 hrs daily.  Qty: 5 patch, Refills: 0    Associated Diagnoses: Hip pain, right; Closed fracture of fifth lumbar vertebra, unspecified fracture morphology, initial encounter (H); Right hip pain      oxyCODONE (ROXICODONE) 5 MG tablet Take 0.5 tablets (2.5 mg) by mouth every 3 hours as needed for moderate to severe pain  Qty: 10 tablet, Refills: 0    Associated Diagnoses: Hip pain, right; Closed fracture of fifth lumbar vertebra, unspecified fracture morphology, initial encounter (H); Right hip pain      senna-docusate (SENOKOT-S/PERICOLACE) 8.6-50 MG tablet Take 1 tablet by mouth 2 times daily as needed for constipation    Associated Diagnoses: Hip pain, right; Closed fracture of fifth lumbar vertebra, unspecified fracture morphology, initial encounter (H); Right hip pain         CONTINUE these medications which have CHANGED    Details   acetaminophen (TYLENOL) 325 MG tablet Take 3 tablets (975 mg) by mouth 3 times daily    Associated Diagnoses: Hip pain, right; Closed fracture of fifth lumbar vertebra, unspecified fracture morphology, initial encounter (H); Right hip pain         CONTINUE these medications which have NOT CHANGED    Details   amLODIPine (NORVASC) 5 MG tablet Take 5 mg by mouth daily      baclofen (LIORESAL) 10 MG tablet TAKE 1 TABLET  BY MOUTH AT BEDTIME  Qty: 28 tablet, Refills: 98    Associated Diagnoses: Muscle spasm      cloNIDine (CATAPRES) 0.1 MG tablet Take 0.1 mg by mouth 2 times daily      famotidine (PEPCID) 20 MG tablet TAKE 1 TABLET BY MOUTH ONCE DAILY  Qty: 31 tablet, Refills: 98    Associated Diagnoses: Gastroesophageal reflux disease, esophagitis presence not specified      gabapentin (NEURONTIN) 100 MG capsule Take 100 mg by mouth 2 times daily      melatonin 3 MG tablet TAKE TWO TABLETS (6MG) BY MOUTH AT BEDTIME  Qty: 56 tablet, Refills: 98    Associated Diagnoses: Insomnia, unspecified type      metoprolol tartrate (LOPRESSOR) 25 MG tablet TAKE 1 TABLET BY MOUTH TWICE DAILY  Qty: 62 tablet, Refills: 98    Associated Diagnoses: Hypertension, unspecified type      sertraline (ZOLOFT) 50 MG tablet Take 50 mg by mouth daily           Allergies   Allergies   Allergen Reactions     Azithromycin Diarrhea     Fenofibrate      Gemfibrozil      Levaquin [Levofloxacin] GI Disturbance     Penicillin G      Rosuvastatin      Vytorin      Bextra [Valdecoxib] Rash     Diltiazem Rash     Hydrochlorothiazide Rash     Sulfa Antibiotics [Sulfa Drugs] Rash     Verapamil Rash

## 2019-08-04 NOTE — PROGRESS NOTES
Your information has been submitted on August 04th, 2019 at 03:20:42 PM CDT. The confirmation number is [id number]

## 2019-08-04 NOTE — PLAN OF CARE
"Pt discharged to TCU, daughter driving. Packet sent. Belongings taken with.  Pt wheeled to front lobby by writer, Ravindra CALZADA, and Terra CALZADA.     /68 (BP Location: Right arm)   Pulse 73   Temp 97.7  F (36.5  C) (Oral)   Resp 16   Ht 1.651 m (5' 5\")   Wt 57.1 kg (125 lb 15.7 oz)   SpO2 99%   BMI 20.96 kg/m      "

## 2019-08-04 NOTE — PROGRESS NOTES
INTERIM NOTE     Discussed patient's diagnosis and care plan with her daughter.  Reviewed imaging findings and plan per Neurosurgery.  Patient's daughter, a [place] provider, voiced some reasonable concerns and frustrations about current situation.  She believes that if a lumbar MRI would have been performed in ER, as she requested, the patient would qualify for TCU coverage today (ie would have met the criteria for the 3 night stay).  As such, she is appealing to Medicare to for patient to get TCU coverage.       Will leave DC order in place.  Continue current plan for symptom management.  Patient to continue to wear brace when up and out of bed.      Appreciate all the excellent skilled nursing care and care coordination by staff to support this patient and her family.     DORAohjazlyn MATTHEWS

## 2019-08-04 NOTE — PROGRESS NOTES
Spoke with Admission at [facility] who reported they have private room. Writer requested for them if they can accept patient.   Spoke with provider who reported pt is ready for discharge.   Spoke with patient but she requested for writer to speak with her daughter June.   Pc to June to discuss private pay cost. Writer informed daughter cost is $3500 for seven days at [facility]. Daughter in agreement with cost even through the frustrated.   Left a message for Caitlyn at [facility] informing her pt is in agreement with private pay cost. Writer asked Caitlyn time of bed availability.   Spoke with Caitlyn at [facility] who reported patient and family will need to come in with a check or card for $3492.16. Caitlyn reported bed is available anytime.   Spoke with daughter June about the exact cost. Daughter reported she will transport and will speak with provider before final decision.   Update nurse.   Received a call from nurse asking about transport. Writer informed nurse pt daughter planned to transport. Nurse reported at noon pt daughter was still deciding on transport.   Left a message for daughter regarding transport. Writer requested a call back.   Received a call from Caitlyn at [facility] asking if pt is still discharging to them. Informed Caitlyn pt plans to discharge but we still working on transport. Caitlyn requested for orders to be faxed [phone].   Patient appealing discharge. Notified facility and requested for transport to cancelled.   Spoke with Johanne at [facility] and informed her pt decided against appeal so writer asked if they still have bed available. Johanne reported bed is available and they review order already.   Updated nurse.

## 2019-08-04 NOTE — PLAN OF CARE
Patient is alert and oriented x4. Vitals are Temp: 97.3  F (36.3  C) Temp src: Oral BP: 94/65   Heart Rate: 67 Resp: 14 SpO2: 96 % RA. Reports baseline 6/10 pain in right hip; scheduled medications given. Patient repositioned for comfort. Respiratory WNL, lung sounds clear. Cardiac WNL. Reports numbness in right great toe- providers aware. Baseline left-sided deficit noted. Patient has difficulty moving and cannot stand without increased pain. Soft back brace on for comfort. Purewick in place overnight. GI WNL. PT recommending TCU. SW assisting with placement. Tolerating regular diet. No IV access. Plan to assist with supportive cares.

## 2019-08-05 ENCOUNTER — NURSING HOME VISIT (OUTPATIENT)
Dept: GERIATRICS | Facility: CLINIC | Age: 84
End: 2019-08-05
Payer: MEDICARE

## 2019-08-05 VITALS
OXYGEN SATURATION: 95 % | TEMPERATURE: 96.8 F | BODY MASS INDEX: 21.85 KG/M2 | WEIGHT: 131.3 LBS | SYSTOLIC BLOOD PRESSURE: 109 MMHG | RESPIRATION RATE: 17 BRPM | HEART RATE: 67 BPM | DIASTOLIC BLOOD PRESSURE: 75 MMHG

## 2019-08-05 DIAGNOSIS — G35 MS (MULTIPLE SCLEROSIS) (H): ICD-10-CM

## 2019-08-05 DIAGNOSIS — S32.009D CLOSED FRACTURE OF LUMBAR SPINE WITHOUT LESION OF SPINAL CORD WITH ROUTINE HEALING, SUBSEQUENT ENCOUNTER: Primary | ICD-10-CM

## 2019-08-05 DIAGNOSIS — I69.254: ICD-10-CM

## 2019-08-05 DIAGNOSIS — F03.90 DEMENTIA WITHOUT BEHAVIORAL DISTURBANCE, UNSPECIFIED DEMENTIA TYPE: ICD-10-CM

## 2019-08-05 DIAGNOSIS — I10 HYPERTENSION, UNSPECIFIED TYPE: ICD-10-CM

## 2019-08-05 DIAGNOSIS — F32.0 MILD MAJOR DEPRESSION (H): ICD-10-CM

## 2019-08-05 DIAGNOSIS — Z86.73 HISTORY OF CVA (CEREBROVASCULAR ACCIDENT): ICD-10-CM

## 2019-08-05 PROCEDURE — 99310 SBSQ NF CARE HIGH MDM 45: CPT | Performed by: NURSE PRACTITIONER

## 2019-08-05 NOTE — LETTER
8/5/2019        RE: Vanda Banks  [facility]  [address] [facility] [address]        [place] [facility]  PRIMARY CARE PROVIDER AND CLINIC:  ONEL Atkinson [facility], [address] / [facility] [address]  Chief Complaint   Patient presents with     Hospital F/U     [place] Medical Record Number:  [id number]  Place of Service where encounter took place:  [facility] ([facility]) [[id number]]    Vanda Banks  is a 88 year old  (10/14/1930), history of hypertension, depression, hemorrhagic stroke last September, osteoarthritis, EP ablation for SVT, GERD, depression, and previous back surgery.  She presented to [facility] from [facility] clinic on 8/1/2019 for hip pain and inability to ambulate.  About 2 weeks ago, Ms Banks had a mechanical fall while walking to the bathroom at her assisted living facility.  Initially, she did not really have any pain however two days later she began to have pain in her right hip radiating down her right lateral thigh. admitted to the above facility from  [facility]. Hospital stay 8/1/19 through 8/4/19..  Admitted to this facility for  rehab, medical management and nursing care.    HPI:    HPI information obtained from: facility chart records, facility staff, patient report and [facility] chart review.   Brief Summary of Hospital Course:   Right hip pain/L5 Fx unstable: MRI revealed non displaced Fx L5: TLSO brace on at all times when out of bed.   Depression: zoloft initially increased per family request, however in light of multiple pain medications resumed PTA dose.   Updates on Status Since Skilled nursing Admission: On exam today patient is laying in bed, just finished therapy, states she is having pain in right hip, pain is worse with weight bearing and movement, less when resting. Denies fever, chills, cough, congestion, SOB, N/V/D states she had a BM yesterday, no other concerns noted on exam.     CODE STATUS/ADVANCE  DIRECTIVES DISCUSSION:   DNR only  Patient's living condition: lives in an assisted living facility  ALLERGIES: Azithromycin; Fenofibrate; Gemfibrozil; Levaquin [levofloxacin]; Penicillin g; Rosuvastatin; Vytorin; Bextra [valdecoxib]; Diltiazem; Hydrochlorothiazide; Sulfa antibiotics [sulfa drugs]; and Verapamil  PAST MEDICAL HISTORY:  has a past medical history of Cerebral infarction (H), Chronic osteoarthritis, Depressive disorder, and Hypertension.  PAST SURGICAL HISTORY:   has a past surgical history that includes back surgery and EP Ablation SVT.  FAMILY HISTORY: family history includes Cerebrovascular Disease in her father; Colon Cancer in her mother; Coronary Artery Disease in her brother and brother; Hypertension in her brother and brother.  SOCIAL HISTORY:   reports that she has never smoked. She has never used smokeless tobacco. She reports that she does not drink alcohol or use drugs.    Post Discharge Medication Reconciliation Status: discharge medications reconciled, continue medications without change    Current Outpatient Medications   Medication Sig Dispense Refill     acetaminophen (TYLENOL) 325 MG tablet Take 3 tablets (975 mg) by mouth 3 times daily       amLODIPine (NORVASC) 5 MG tablet Take 5 mg by mouth daily       baclofen (LIORESAL) 10 MG tablet TAKE 1 TABLET BY MOUTH AT BEDTIME 28 tablet 98     cloNIDine (CATAPRES) 0.1 MG tablet Take 0.1 mg by mouth 2 times daily       famotidine (PEPCID) 20 MG tablet TAKE 1 TABLET BY MOUTH ONCE DAILY 31 tablet 98     gabapentin (NEURONTIN) 100 MG capsule Take 100 mg by mouth 2 times daily       Lidocaine (LIDOCARE) 4 % Patch Apply 1-2 patches to right hip and/or back.  OK to cut to size.  To prevent lidocaine toxicity, patient should be patch free for 12 hrs daily. 5 patch 0     melatonin 3 MG tablet TAKE TWO TABLETS (6MG) BY MOUTH AT BEDTIME 56 tablet 98     metoprolol tartrate (LOPRESSOR) 25 MG tablet TAKE 1 TABLET BY MOUTH TWICE DAILY 62 tablet 98      oxyCODONE (ROXICODONE) 5 MG tablet Take 0.5 tablets (2.5 mg) by mouth every 3 hours as needed for moderate to severe pain 10 tablet 0     senna-docusate (SENOKOT-S/PERICOLACE) 8.6-50 MG tablet Take 1 tablet by mouth 2 times daily as needed for constipation       sertraline (ZOLOFT) 50 MG tablet Take 50 mg by mouth daily         ROS:  10 point ROS of systems including Constitutional, Eyes, Respiratory, Cardiovascular, Gastroenterology, Genitourinary, Integumentary, Musculoskeletal, Psychiatric were all negative except for pertinent positives noted in my HPI.    Vitals:  /75   Pulse 67   Temp 96.8  F (36  C)   Resp 17   Wt 59.6 kg (131 lb 4.8 oz)   SpO2 95%   BMI 21.85 kg/m     Exam:  GENERAL APPEARANCE:  Alert, in no distress  ENT:  Mouth and posterior oropharynx normal, moist mucous membranes, [place]  EYES:  EOM, conjunctivae, lids, pupils and irises normal, PERRL  RESP:  respiratory effort and palpation of chest normal, lungs clear to auscultation , no respiratory distress  CV:  Palpation and auscultation of heart done , regular rate and rhythm, no murmur, rub, or gallop, no edema  ABDOMEN:  normal bowel sounds, soft, nontender, no hepatosplenomegaly or other masses  M/S:   Examination of:   right upper extremity and right lower extremity  Inspection, ROM, stability and muscle strength normal and weakness LUE and LLE hx of CVA  SKIN:  Inspection of skin and subcutaneous tissue baseline  NEURO:   Cranial nerves 2-12 are normal tested and grossly at patient's baseline, speech WNL  PSYCH:  affect and mood normal    Lab/Diagnostic data:  Recent labs in [facility] reviewed by me today.     ASSESSMENT/PLAN:  Closed fracture of lumbar spine without lesion of spinal cord with routine healing, subsequent encounter  Acute/ongoing: TLSO brace on at all times when out of bed. 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, unspecified hemiplegia type (H)  MS (multiple sclerosis) (H)  Ongoing: PT and OT for strengthening, continue baclofen 10mg qhs    Hypertension, unspecified type  Ongoing: vitals daily and prn, BMP follow, continue lopressor 25mg BID, clonidine 0.1mg BID, norvasc 5mg QD     Dementia without behavioral disturbance, unspecified dementia type  Mild major depression (H)  Acute/ongoing: PT and OT for strengthening, continue zoloft 50mg QD       Orders written by provider at facility  BMP and Hgb on Thursday  Senna s 1 PO BID scheduled and 1 PO BID prn  Clarified lidocaine patch orders    Total time spent with patient visit at the skilled nursing facility was 45 min including patient visit and review of past records. Greater than 50% of total time spent with counseling and coordinating care due to coordinating care with nurse on admission orders, coordinating care with follow up labs and appointments and counseling patient/resident for 10 minutes on: the reason for their hospitalization and what the treatment is, the plan of SNF stay and projected length of stay, current medications (treatments) reconciled from the hospital, recent past lab and imaging results and subsequent treatment plan and current pain control plan and controlled substances ordered and taper plan of care.    Electronically signed by:  Tonya Lynn Haase, APRN CNP                         Sincerely,        Tonya Lynn Haase, APRN CNP

## 2019-08-05 NOTE — PLAN OF CARE
Physical Therapy Discharge Summary    Reason for therapy discharge:    Discharged to transitional care facility.    Progress towards therapy goal(s). See goals on Care Plan in [facility] electronic health record for goal details.  Goals not met.  Barriers to achieving goals:   discharge from facility.    Therapy recommendation(s):    Continued therapy is recommended.  Rationale/Recommendations:  PT as indicated at TCU.     Note: Pt not seen by documenting PT on this date. Information obtained from chart review.

## 2019-08-05 NOTE — PROGRESS NOTES
[place] [facility]  PRIMARY CARE PROVIDER AND CLINIC:  ONEL Atkinson CNP, [address] / [facility] [address]  Chief Complaint   Patient presents with     Hospital F/U     [place] Medical Record Number:  [id number]  Place of Service where encounter took place:  [facility] ([facility]) [[id number]]    Vanda Banks  is a 88 year old  (10/14/1930), history of hypertension, depression, hemorrhagic stroke last September, osteoarthritis, EP ablation for SVT, GERD, depression, and previous back surgery.  She presented to [facility] from [facility] clinic on 8/1/2019 for hip pain and inability to ambulate.  About 2 weeks ago, Ms Banks had a mechanical fall while walking to the bathroom at her assisted living facility.  Initially, she did not really have any pain however two days later she began to have pain in her right hip radiating down her right lateral thigh. admitted to the above facility from  [facility]. Hospital stay 8/1/19 through 8/4/19..  Admitted to this facility for  rehab, medical management and nursing care.    HPI:    HPI information obtained from: facility chart records, facility staff, patient report and [facility] chart review.   Brief Summary of Hospital Course:   Right hip pain/L5 Fx unstable: MRI revealed non displaced Fx L5: TLSO brace on at all times when out of bed.   Depression: zoloft initially increased per family request, however in light of multiple pain medications resumed PTA dose.   Updates on Status Since Skilled nursing Admission: On exam today patient is laying in bed, just finished therapy, states she is having pain in right hip, pain is worse with weight bearing and movement, less when resting. Denies fever, chills, cough, congestion, SOB, N/V/D states she had a BM yesterday, no other concerns noted on exam.     CODE STATUS/ADVANCE DIRECTIVES DISCUSSION:   DNR only  Patient's living condition: lives in an assisted living  facility  ALLERGIES: Azithromycin; Fenofibrate; Gemfibrozil; Levaquin [levofloxacin]; Penicillin g; Rosuvastatin; Vytorin; Bextra [valdecoxib]; Diltiazem; Hydrochlorothiazide; Sulfa antibiotics [sulfa drugs]; and Verapamil  PAST MEDICAL HISTORY:  has a past medical history of Cerebral infarction (H), Chronic osteoarthritis, Depressive disorder, and Hypertension.  PAST SURGICAL HISTORY:   has a past surgical history that includes back surgery and EP Ablation SVT.  FAMILY HISTORY: family history includes Cerebrovascular Disease in her father; Colon Cancer in her mother; Coronary Artery Disease in her brother and brother; Hypertension in her brother and brother.  SOCIAL HISTORY:   reports that she has never smoked. She has never used smokeless tobacco. She reports that she does not drink alcohol or use drugs.    Post Discharge Medication Reconciliation Status: discharge medications reconciled, continue medications without change    Current Outpatient Medications   Medication Sig Dispense Refill     acetaminophen (TYLENOL) 325 MG tablet Take 3 tablets (975 mg) by mouth 3 times daily       amLODIPine (NORVASC) 5 MG tablet Take 5 mg by mouth daily       baclofen (LIORESAL) 10 MG tablet TAKE 1 TABLET BY MOUTH AT BEDTIME 28 tablet 98     cloNIDine (CATAPRES) 0.1 MG tablet Take 0.1 mg by mouth 2 times daily       famotidine (PEPCID) 20 MG tablet TAKE 1 TABLET BY MOUTH ONCE DAILY 31 tablet 98     gabapentin (NEURONTIN) 100 MG capsule Take 100 mg by mouth 2 times daily       Lidocaine (LIDOCARE) 4 % Patch Apply 1-2 patches to right hip and/or back.  OK to cut to size.  To prevent lidocaine toxicity, patient should be patch free for 12 hrs daily. 5 patch 0     melatonin 3 MG tablet TAKE TWO TABLETS (6MG) BY MOUTH AT BEDTIME 56 tablet 98     metoprolol tartrate (LOPRESSOR) 25 MG tablet TAKE 1 TABLET BY MOUTH TWICE DAILY 62 tablet 98     oxyCODONE (ROXICODONE) 5 MG tablet Take 0.5 tablets (2.5 mg) by mouth every 3 hours as needed  for moderate to severe pain 10 tablet 0     senna-docusate (SENOKOT-S/PERICOLACE) 8.6-50 MG tablet Take 1 tablet by mouth 2 times daily as needed for constipation       sertraline (ZOLOFT) 50 MG tablet Take 50 mg by mouth daily         ROS:  10 point ROS of systems including Constitutional, Eyes, Respiratory, Cardiovascular, Gastroenterology, Genitourinary, Integumentary, Musculoskeletal, Psychiatric were all negative except for pertinent positives noted in my HPI.    Vitals:  /75   Pulse 67   Temp 96.8  F (36  C)   Resp 17   Wt 59.6 kg (131 lb 4.8 oz)   SpO2 95%   BMI 21.85 kg/m    Exam:  GENERAL APPEARANCE:  Alert, in no distress  ENT:  Mouth and posterior oropharynx normal, moist mucous membranes, [place]  EYES:  EOM, conjunctivae, lids, pupils and irises normal, PERRL  RESP:  respiratory effort and palpation of chest normal, lungs clear to auscultation , no respiratory distress  CV:  Palpation and auscultation of heart done , regular rate and rhythm, no murmur, rub, or gallop, no edema  ABDOMEN:  normal bowel sounds, soft, nontender, no hepatosplenomegaly or other masses  M/S:   Examination of:   right upper extremity and right lower extremity  Inspection, ROM, stability and muscle strength normal and weakness LUE and LLE hx of CVA  SKIN:  Inspection of skin and subcutaneous tissue baseline  NEURO:   Cranial nerves 2-12 are normal tested and grossly at patient's baseline, speech WNL  PSYCH:  affect and mood normal    Lab/Diagnostic data:  Recent labs in [facility] reviewed by me today.     ASSESSMENT/PLAN:  Closed fracture of lumbar spine without lesion of spinal cord with routine healing, subsequent encounter  Acute/ongoing: TLSO brace on at all times when out of bed. PT and OT for strengthening, continue tylenol 975mg TID, neurontin 100mg BID, lidocaine patch 4% to low back on AM and off hs, continue oxycodone 2.5mg q 3 hours prn,     Constipation:   Acute/ongoing: senna s 1 PO BID scheduled and 1 PO BID  prn      Acute/ongoing:   History of CVA (cerebrovascular accident)  Hemiplegia of left nondominant side as late effect of other nontraumatic intracranial hemorrhage, unspecified hemiplegia type (H)  MS (multiple sclerosis) (H)  Ongoing: PT and OT for strengthening, continue baclofen 10mg qhs    Hypertension, unspecified type  Ongoing: vitals daily and prn, BMP follow, continue lopressor 25mg BID, clonidine 0.1mg BID, norvasc 5mg QD     Dementia without behavioral disturbance, unspecified dementia type  Mild major depression (H)  Acute/ongoing: PT and OT for strengthening, continue zoloft 50mg QD       Orders written by provider at facility  BMP and Hgb on Thursday  Senna s 1 PO BID scheduled and 1 PO BID prn  Clarified lidocaine patch orders    Total time spent with patient visit at the skilled nursing facility was 45 min including patient visit and review of past records. Greater than 50% of total time spent with counseling and coordinating care due to coordinating care with nurse on admission orders, coordinating care with follow up labs and appointments and counseling patient/resident for 10 minutes on: the reason for their hospitalization and what the treatment is, the plan of SNF stay and projected length of stay, current medications (treatments) reconciled from the hospital, recent past lab and imaging results and subsequent treatment plan and current pain control plan and controlled substances ordered and taper plan of care.    Electronically signed by:  Tonya Lynn Haase, APRN CNP

## 2019-08-07 ENCOUNTER — NURSING HOME VISIT (OUTPATIENT)
Dept: GERIATRICS | Facility: CLINIC | Age: 84
End: 2019-08-07
Payer: MEDICARE

## 2019-08-07 VITALS
WEIGHT: 130.8 LBS | SYSTOLIC BLOOD PRESSURE: 121 MMHG | DIASTOLIC BLOOD PRESSURE: 82 MMHG | OXYGEN SATURATION: 97 % | HEART RATE: 60 BPM | TEMPERATURE: 97.1 F | RESPIRATION RATE: 18 BRPM | BODY MASS INDEX: 21.77 KG/M2

## 2019-08-07 DIAGNOSIS — S32.009D CLOSED FRACTURE OF LUMBAR SPINE WITHOUT LESION OF SPINAL CORD WITH ROUTINE HEALING, SUBSEQUENT ENCOUNTER: Primary | ICD-10-CM

## 2019-08-07 DIAGNOSIS — Z86.73 HISTORY OF CVA (CEREBROVASCULAR ACCIDENT): ICD-10-CM

## 2019-08-07 DIAGNOSIS — I10 HYPERTENSION, UNSPECIFIED TYPE: ICD-10-CM

## 2019-08-07 DIAGNOSIS — I69.254: ICD-10-CM

## 2019-08-07 DIAGNOSIS — M62.838 MUSCLE SPASM: ICD-10-CM

## 2019-08-07 DIAGNOSIS — F03.90 DEMENTIA WITHOUT BEHAVIORAL DISTURBANCE, UNSPECIFIED DEMENTIA TYPE: ICD-10-CM

## 2019-08-07 PROCEDURE — 99310 SBSQ NF CARE HIGH MDM 45: CPT | Performed by: NURSE PRACTITIONER

## 2019-08-07 RX ORDER — OXYCODONE HYDROCHLORIDE 5 MG/1
TABLET ORAL
Status: ON HOLD | COMMUNITY
End: 2020-06-19

## 2019-08-07 RX ORDER — LIDOCAINE 4 G/G
PATCH TOPICAL
Status: ON HOLD | COMMUNITY
End: 2020-06-19

## 2019-08-07 RX ORDER — POLYETHYLENE GLYCOL 3350 17 G/17G
1 POWDER, FOR SOLUTION ORAL DAILY PRN
COMMUNITY

## 2019-08-07 RX ORDER — SENNOSIDES 8.6 MG
1 TABLET ORAL 2 TIMES DAILY
Status: ON HOLD | COMMUNITY
End: 2020-06-19

## 2019-08-07 NOTE — LETTER
"    8/7/2019        RE: Vanda Banks  [facility]  [address] [facility] [address]        [place] [facility]  [place] Medical Record Number:  [id number]  Place of Service where encounter took place:  [facility] ([facility] [[id number]]  Chief Complaint   Patient presents with     RECHECK       HPI:    Vanda Banks  is a 88 year old (10/14/1930), who is being seen today for an episodic care visit.  HPI information obtained from: facility chart records, facility staff, patient report and [facility] chart review. Today's concern is:  Right hip pain?L5 Fx unstable: on exam today patient c/o ongoing pain in right hip, having some \"shooting pain\" down right buttocks which increases with activity, therapy concerned about muscle spasms, seems oxycodone is not very effective in pain management.   HTN: BP as follows: 141/88, 121/84 with HR in 60-70's denies CP, palpitations, admit weight was 131.3 lbs and todays weight is 127.7lbs  Hx of CVA/left hemiparesis: working with therapy    Past Medical and Surgical History reviewed in Epic today.    MEDICATIONS:  Current Outpatient Medications   Medication Sig Dispense Refill     acetaminophen (TYLENOL) 325 MG tablet Take 3 tablets (975 mg) by mouth 3 times daily       amLODIPine (NORVASC) 5 MG tablet Take 5 mg by mouth daily       baclofen (LIORESAL) 10 MG tablet TAKE 1 TABLET BY MOUTH AT BEDTIME 28 tablet 98     cloNIDine (CATAPRES) 0.1 MG tablet Take 0.1 mg by mouth 2 times daily       famotidine (PEPCID) 20 MG tablet TAKE 1 TABLET BY MOUTH ONCE DAILY 31 tablet 98     gabapentin (NEURONTIN) 100 MG capsule Take 100 mg by mouth 2 times daily       Lidocaine (LIDOCARE) 4 % Patch To Apply to lower back topically one time a day for pain put ON patch AND Apply to lower back topically one time a day for pain take OFF patch       melatonin 3 MG tablet TAKE TWO TABLETS (6MG) BY MOUTH AT BEDTIME 56 tablet 98     metoprolol tartrate (LOPRESSOR) 25 MG tablet " TAKE 1 TABLET BY MOUTH TWICE DAILY 62 tablet 98     oxyCODONE (ROXICODONE) 5 MG tablet Give 0.5 tablet by mouth every 3 hours as needed for pain 3-6 AND Give 1 tablet by mouth every 3 hours as needed for pain 7-10       polyethylene glycol (MIRALAX/GLYCOLAX) packet Take 1 packet by mouth daily as needed for constipation       sennosides (SENOKOT) 8.6 MG tablet Take 1 tablet by mouth 2 times daily       sertraline (ZOLOFT) 50 MG tablet Take 50 mg by mouth daily       Lidocaine (LIDOCARE) 4 % Patch Apply 1-2 patches to right hip and/or back.  OK to cut to size.  To prevent lidocaine toxicity, patient should be patch free for 12 hrs daily. (Patient not taking: Reported on 8/7/2019) 5 patch 0     oxyCODONE (ROXICODONE) 5 MG tablet Take 0.5 tablets (2.5 mg) by mouth every 3 hours as needed for moderate to severe pain (Patient not taking: Reported on 8/7/2019) 10 tablet 0     senna-docusate (SENOKOT-S/PERICOLACE) 8.6-50 MG tablet Take 1 tablet by mouth 2 times daily as needed for constipation (Patient not taking: Reported on 8/7/2019)           REVIEW OF SYSTEMS:  10 point ROS of systems including Constitutional, Eyes, Respiratory, Cardiovascular, Gastroenterology, Genitourinary, Integumentary, Musculoskeletal, Psychiatric were all negative except for pertinent positives noted in my HPI.    Objective:  /82   Pulse 60   Temp 97.1  F (36.2  C)   Resp 18   Wt 59.3 kg (130 lb 12.8 oz)   SpO2 97%   BMI 21.77 kg/m       Exam:  GENERAL APPEARANCE:  Alert, in no distress  ENT:  Mouth and posterior oropharynx normal, moist mucous membranes, [place]  EYES:  EOM, conjunctivae, lids, pupils and irises normal, PERRL  RESP:  respiratory effort and palpation of chest normal, lungs clear to auscultation , no respiratory distress  CV:  Palpation and auscultation of heart done , regular rate and rhythm, no murmur, rub, or gallop, no edema  ABDOMEN:  normal bowel sounds, soft, nontender, no hepatosplenomegaly or other masses  M/S:    Examination of:   right upper extremity and right lower extremity  Inspection, ROM, stability and muscle strength normal and weakness LUE and LLE hx of CVA  SKIN:  Inspection of skin and subcutaneous tissue baseline  NEURO:   Cranial nerves 2-12 are normal tested and grossly at patient's baseline, speech WNL  PSYCH:  affect and mood normal    Labs:   Recent labs in [facility] reviewed by me today.     ASSESSMENT/PLAN:  Closed fracture of lumbar spine without lesion of spinal cord with routine healing, subsequent encounter  Acute/ongoing: TLSO brace on at all times when out of bed. PT and OT for strengthening, continue tylenol 975mg TID, increase neurontin to 100mg TID, add vistaril 25mg q 6 horus prn, continue lidocaine patch 4% to low back on AM and off hs, continue oxycodone 2.5mg q 3 hours prn,      Constipation:   Acute/ongoing: senna s 1 PO BID scheduled and 1 PO BID prn        Acute/ongoing:   History of CVA (cerebrovascular accident)  Hemiplegia of left nondominant side as late effect of other nontraumatic intracranial hemorrhage, unspecified hemiplegia type (H)  MS (multiple sclerosis) (H)  Ongoing: PT and OT for strengthening, continue baclofen 10mg qhs     Hypertension, unspecified type  Ongoing: vitals daily and prn, BMP follow, continue lopressor 25mg BID, clonidine 0.1mg BID, norvasc 5mg QD          Orders written by provider at facility  Increase neurontin to 100mg TID scheduled  Add vistaril 25mg q 6 hours prn    Total time spent with patient visit at the [facility] nursing facility was 45 min including patient visit and review of past records. Greater than 50% of total time spent with counseling and coordinating care due to coordinating care with nurse on admission orders, coordinating care with follow up labs and appointments and counseling patient/resident for 15 minutes on: the reason for their hospitalization and what the treatment is, recent past lab and imaging results and subsequent treatment plan and  current pain control plan and controlled substances ordered and taper plan of care.  Electronically signed by:  Tonya Lynn Haase, APRN CNP               Sincerely,        Tonya Lynn Haase, APRN CNP

## 2019-08-07 NOTE — PROGRESS NOTES
"[place] [facility]  [place] Medical Record Number:  [id number]  Place of Service where encounter took place:  [facility] ([facility] [[id number]]  Chief Complaint   Patient presents with     RECHECK       HPI:    Vanda Banks  is a 88 year old (10/14/1930), who is being seen today for an episodic care visit.  HPI information obtained from: facility chart records, facility staff, patient report and [facility] chart review. Today's concern is:  Right hip pain?L5 Fx unstable: on exam today patient c/o ongoing pain in right hip, having some \"shooting pain\" down right buttocks which increases with activity, therapy concerned about muscle spasms, seems oxycodone is not very effective in pain management.   HTN: BP as follows: 141/88, 121/84 with HR in 60-70's denies CP, palpitations, admit weight was 131.3 lbs and todays weight is 127.7lbs  Hx of CVA/left hemiparesis: working with therapy    Past Medical and Surgical History reviewed in Epic today.    MEDICATIONS:  Current Outpatient Medications   Medication Sig Dispense Refill     acetaminophen (TYLENOL) 325 MG tablet Take 3 tablets (975 mg) by mouth 3 times daily       amLODIPine (NORVASC) 5 MG tablet Take 5 mg by mouth daily       baclofen (LIORESAL) 10 MG tablet TAKE 1 TABLET BY MOUTH AT BEDTIME 28 tablet 98     cloNIDine (CATAPRES) 0.1 MG tablet Take 0.1 mg by mouth 2 times daily       famotidine (PEPCID) 20 MG tablet TAKE 1 TABLET BY MOUTH ONCE DAILY 31 tablet 98     gabapentin (NEURONTIN) 100 MG capsule Take 100 mg by mouth 2 times daily       Lidocaine (LIDOCARE) 4 % Patch To Apply to lower back topically one time a day for pain put ON patch AND Apply to lower back topically one time a day for pain take OFF patch       melatonin 3 MG tablet TAKE TWO TABLETS (6MG) BY MOUTH AT BEDTIME 56 tablet 98     metoprolol tartrate (LOPRESSOR) 25 MG tablet TAKE 1 TABLET BY MOUTH TWICE DAILY 62 tablet 98     oxyCODONE (ROXICODONE) 5 MG tablet Give 0.5 tablet by " mouth every 3 hours as needed for pain 3-6 AND Give 1 tablet by mouth every 3 hours as needed for pain 7-10       polyethylene glycol (MIRALAX/GLYCOLAX) packet Take 1 packet by mouth daily as needed for constipation       sennosides (SENOKOT) 8.6 MG tablet Take 1 tablet by mouth 2 times daily       sertraline (ZOLOFT) 50 MG tablet Take 50 mg by mouth daily       Lidocaine (LIDOCARE) 4 % Patch Apply 1-2 patches to right hip and/or back.  OK to cut to size.  To prevent lidocaine toxicity, patient should be patch free for 12 hrs daily. (Patient not taking: Reported on 8/7/2019) 5 patch 0     oxyCODONE (ROXICODONE) 5 MG tablet Take 0.5 tablets (2.5 mg) by mouth every 3 hours as needed for moderate to severe pain (Patient not taking: Reported on 8/7/2019) 10 tablet 0     senna-docusate (SENOKOT-S/PERICOLACE) 8.6-50 MG tablet Take 1 tablet by mouth 2 times daily as needed for constipation (Patient not taking: Reported on 8/7/2019)           REVIEW OF SYSTEMS:  10 point ROS of systems including Constitutional, Eyes, Respiratory, Cardiovascular, Gastroenterology, Genitourinary, Integumentary, Musculoskeletal, Psychiatric were all negative except for pertinent positives noted in my HPI.    Objective:  /82   Pulse 60   Temp 97.1  F (36.2  C)   Resp 18   Wt 59.3 kg (130 lb 12.8 oz)   SpO2 97%   BMI 21.77 kg/m      Exam:  GENERAL APPEARANCE:  Alert, in no distress  ENT:  Mouth and posterior oropharynx normal, moist mucous membranes, [place]  EYES:  EOM, conjunctivae, lids, pupils and irises normal, PERRL  RESP:  respiratory effort and palpation of chest normal, lungs clear to auscultation , no respiratory distress  CV:  Palpation and auscultation of heart done , regular rate and rhythm, no murmur, rub, or gallop, no edema  ABDOMEN:  normal bowel sounds, soft, nontender, no hepatosplenomegaly or other masses  M/S:   Examination of:   right upper extremity and right lower extremity  Inspection, ROM, stability and muscle  strength normal and weakness LUE and LLE hx of CVA  SKIN:  Inspection of skin and subcutaneous tissue baseline  NEURO:   Cranial nerves 2-12 are normal tested and grossly at patient's baseline, speech WNL  PSYCH:  affect and mood normal    Labs:   Recent labs in [facility] reviewed by me today.     ASSESSMENT/PLAN:  Closed fracture of lumbar spine without lesion of spinal cord with routine healing, subsequent encounter  Acute/ongoing: TLSO brace on at all times when out of bed. 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 Greater than 50% of total time spent with counseling and coordinating care due to coordinating care with nurse on admission orders, coordinating care with follow up labs and appointments and counseling patient/resident for 15 minutes on: the reason for their hospitalization and what the treatment is, recent past lab and imaging results and subsequent treatment plan and current pain control plan and controlled substances ordered and taper plan of care.  Electronically  signed by:  Tonya Lynn Haase, APRN CNP

## 2019-08-08 ENCOUNTER — TRANSFERRED RECORDS (OUTPATIENT)
Dept: HEALTH INFORMATION MANAGEMENT | Facility: CLINIC | Age: 84
End: 2019-08-08

## 2019-08-08 LAB — HEMOGLOBIN: 13.3 G/DL (ref 12–15.5)

## 2019-08-09 ENCOUNTER — NURSING HOME VISIT (OUTPATIENT)
Dept: GERIATRICS | Facility: CLINIC | Age: 84
End: 2019-08-09
Payer: MEDICARE

## 2019-08-09 DIAGNOSIS — Z86.73 HISTORY OF CVA (CEREBROVASCULAR ACCIDENT): ICD-10-CM

## 2019-08-09 DIAGNOSIS — I10 ESSENTIAL HYPERTENSION: ICD-10-CM

## 2019-08-09 DIAGNOSIS — F32.0 MILD MAJOR DEPRESSION (H): ICD-10-CM

## 2019-08-09 DIAGNOSIS — S32.009D CLOSED FRACTURE OF LUMBAR SPINE WITHOUT LESION OF SPINAL CORD WITH ROUTINE HEALING, SUBSEQUENT ENCOUNTER: Primary | ICD-10-CM

## 2019-08-09 PROCEDURE — 99305 1ST NF CARE MODERATE MDM 35: CPT | Performed by: INTERNAL MEDICINE

## 2019-08-10 NOTE — PROGRESS NOTES
[place] [facility]  PRIMARY CARE PROVIDER AND CLINIC:  Julee Jauregui, ONEL CNP, [address] / [facility] [address]      Patient was seen by Dr. Brandon at the [facility] on August 9, 2019, for an initial TCU visit.        Patient is a 88 year old  (10/14/1930), history of hypertension, CVA, GERD, who was hospitalized at [facility] from August 1, 2019 through August 4, 2019 for the management of acute right hip and thigh pain following a mechanical fall 2 weeks prior to admission.    MRI revealed a nondisplaced L5 fracture.  Patient was fitted with a TLSO brace which should be worn at all times.    Admitted to this facility for  rehab, medical management and nursing care.    Patient notes continued pain primarily in her right buttocks and right lateral thigh though she states this is been gradually improving.  She is standing with physical therapy.  She remains on oxycodone, Vistaril,  Neurontin.  She denies bowel or bladder difficulties.    History of L sided weakness s/p CVA.    She denies cough, chest pain, shortness of breath, dizziness, nausea, vomiting, difficulty swallowing, dysuria, fevers, chills.    CODE STATUS/ADVANCE DIRECTIVES DISCUSSION:   DNR only  Patient's living condition: lives in an assisted living facility  ALLERGIES: Azithromycin; Fenofibrate; Gemfibrozil; Levaquin [levofloxacin]; Penicillin g; Rosuvastatin; Vytorin; Bextra [valdecoxib]; Diltiazem; Hydrochlorothiazide; Sulfa antibiotics [sulfa drugs]; and Verapamil  PAST MEDICAL HISTORY:  has a past medical history of Cerebral infarction (H), Chronic osteoarthritis, Depressive disorder, and Hypertension.  PAST SURGICAL HISTORY:   has a past surgical history that includes back surgery and EP Ablation SVT.  FAMILY HISTORY: family history includes Cerebrovascular Disease in her father; Colon Cancer in her mother; Coronary Artery Disease in her brother and brother; Hypertension in her brother and  brother.  SOCIAL HISTORY:   reports that she has never smoked. She has never used smokeless tobacco. She reports that she does not drink alcohol or use drugs.      Current Outpatient Medications   Medication Sig Dispense Refill     acetaminophen (TYLENOL) 325 MG tablet Take 3 tablets (975 mg) by mouth 3 times daily       amLODIPine (NORVASC) 5 MG tablet Take 5 mg by mouth daily       baclofen (LIORESAL) 10 MG tablet TAKE 1 TABLET BY MOUTH AT BEDTIME 28 tablet 98     cloNIDine (CATAPRES) 0.1 MG tablet Take 0.1 mg by mouth 2 times daily       famotidine (PEPCID) 20 MG tablet TAKE 1 TABLET BY MOUTH ONCE DAILY 31 tablet 98     gabapentin (NEURONTIN) 100 MG capsule Take 100 mg by mouth 2 times daily       Lidocaine (LIDOCARE) 4 % Patch To Apply to lower back topically one time a day for pain put ON patch AND Apply to lower back topically one time a day for pain take OFF patch       melatonin 3 MG tablet TAKE TWO TABLETS (6MG) BY MOUTH AT BEDTIME 56 tablet 98     metoprolol tartrate (LOPRESSOR) 25 MG tablet TAKE 1 TABLET BY MOUTH TWICE DAILY 62 tablet 98     oxyCODONE (ROXICODONE) 5 MG tablet Give 0.5 tablet by mouth every 3 hours as needed for pain 3-6 AND Give 1 tablet by mouth every 3 hours as needed for pain 7-10       polyethylene glycol (MIRALAX/GLYCOLAX) packet Take 1 packet by mouth daily as needed for constipation       sennosides (SENOKOT) 8.6 MG tablet Take 1 tablet by mouth 2 times daily       sertraline (ZOLOFT) 50 MG tablet Take 50 mg by mouth daily         ROS:  10 point ROS negative except as noted above      Exam:  GENERAL APPEARANCE:  Alert, in no distress, lying in bed   ENT: Oral mucosa moist [place]  EYES: No eye redness or drainage.  RESP: Respiratory 12, unlabored.    CV: RRR.  ABDOMEN: Soft, nondistended, nontender  TLSO brace in place  M/S: No lower extremity edema.  Mild discomfort right hip with range of motion.  SKIN: No rash  NEURO: Alert, oriented to person, place, general circumstances.  Mild  left-sided weakness.  Face symmetric.  Speech normal.  Right lower extremity strength intact with patient lying in bed  PSYCH:  affect and mood normal        ASSESSMENT/PLAN:    Closed fracture of lumbar spine without lesion of spinal cord with routine healing, subsequent encounter  Acute/ongoing:   Patient notes gradual improvement in right lateral thigh pain   Right lower extremity strength intact.    Plan: TLSO brace on at all times when out of bed. PT and OT for strengthening, continue Tylenol, gabapentin, lidocaine patch, low-dose oxycodone.  Monitor bowel and bladder function.     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    Hypertension, unspecified type  Stable  Plan: Continue current medications.      Mild major depression (H)  Acute/ongoing: PT and OT for strengthening, continue zoloft 50mg QD       .  Gerson Brandon MD

## 2019-08-21 VITALS
BODY MASS INDEX: 22.07 KG/M2 | OXYGEN SATURATION: 98 % | TEMPERATURE: 97.3 F | RESPIRATION RATE: 16 BRPM | SYSTOLIC BLOOD PRESSURE: 144 MMHG | DIASTOLIC BLOOD PRESSURE: 82 MMHG | HEART RATE: 68 BPM | WEIGHT: 132.6 LBS

## 2019-08-21 RX ORDER — HYDROXYZINE PAMOATE 25 MG/1
25 CAPSULE ORAL EVERY 6 HOURS PRN
COMMUNITY
End: 2019-09-04

## 2019-08-22 ENCOUNTER — NURSING HOME VISIT (OUTPATIENT)
Dept: GERIATRICS | Facility: CLINIC | Age: 84
End: 2019-08-22
Payer: MEDICARE

## 2019-08-22 DIAGNOSIS — I69.254: ICD-10-CM

## 2019-08-22 DIAGNOSIS — F32.0 MILD MAJOR DEPRESSION (H): ICD-10-CM

## 2019-08-22 DIAGNOSIS — Z86.73 HISTORY OF CVA (CEREBROVASCULAR ACCIDENT): ICD-10-CM

## 2019-08-22 DIAGNOSIS — F03.90 DEMENTIA WITHOUT BEHAVIORAL DISTURBANCE, UNSPECIFIED DEMENTIA TYPE: ICD-10-CM

## 2019-08-22 DIAGNOSIS — I10 ESSENTIAL HYPERTENSION: ICD-10-CM

## 2019-08-22 DIAGNOSIS — I10 HYPERTENSION, UNSPECIFIED TYPE: ICD-10-CM

## 2019-08-22 DIAGNOSIS — S32.009D CLOSED FRACTURE OF LUMBAR SPINE WITHOUT LESION OF SPINAL CORD WITH ROUTINE HEALING, SUBSEQUENT ENCOUNTER: Primary | ICD-10-CM

## 2019-08-22 PROCEDURE — 99310 SBSQ NF CARE HIGH MDM 45: CPT | Performed by: NURSE PRACTITIONER

## 2019-08-22 NOTE — PROGRESS NOTES
[place] [facility]  [place] Medical Record Number:  [id number]  Place of Service where encounter took place:  [facility] ([facility]) [[id number]]  Chief Complaint   Patient presents with     RECHECK       HPI:    Vanda Banks  is a 88 year old (10/14/1930), who is being seen today for an episodic care visit.  HPI information obtained from: facility chart records, facility staff, patient report and [facility] chart review. Today's concern is:  Right hip pain/L5 Fx unstable: on exam today patient continues to have right hip pain, pain is improving some, but ongoing, patient is working with therapy has taken up to 15 steps in parallel bars with therapy ,making slow progress.   HTN: BP as follows: 133/83, 137/80, 144/82 with HR in 60's denies CP, palpitations, admit weight was 131.3 lbs and todays weight is 127.7lbs  Hx of CVA/left hemiparesis: working with therapy    Past Medical and Surgical History reviewed in Epic today.    MEDICATIONS:  Current Outpatient Medications   Medication Sig Dispense Refill     acetaminophen (TYLENOL) 325 MG tablet Take 3 tablets (975 mg) by mouth 3 times daily       amLODIPine (NORVASC) 5 MG tablet Take 5 mg by mouth daily       baclofen (LIORESAL) 10 MG tablet TAKE 1 TABLET BY MOUTH AT BEDTIME 28 tablet 98     cloNIDine (CATAPRES) 0.1 MG tablet Take 0.1 mg by mouth 2 times daily       famotidine (PEPCID) 20 MG tablet TAKE 1 TABLET BY MOUTH ONCE DAILY 31 tablet 98     gabapentin (NEURONTIN) 100 MG capsule Take 100 mg by mouth 3 times daily        hydrOXYzine (VISTARIL) 25 MG capsule Take 25 mg by mouth every 6 hours as needed for itching       Lidocaine (LIDOCARE) 4 % Patch To Apply to lower back topically one time a day for pain put ON patch AND Apply to lower back topically one time a day for pain take OFF patch       melatonin 3 MG tablet TAKE TWO TABLETS (6MG) BY MOUTH AT BEDTIME 56 tablet 98     metoprolol tartrate (LOPRESSOR) 25 MG tablet TAKE 1 TABLET BY MOUTH  TWICE DAILY 62 tablet 98     oxyCODONE (ROXICODONE) 5 MG tablet Give 0.5 tablet by mouth every 3 hours as needed for pain 3-6 AND Give 1 tablet by mouth every 3 hours as needed for pain 7-10       polyethylene glycol (MIRALAX/GLYCOLAX) packet Take 1 packet by mouth daily as needed for constipation       sennosides (SENOKOT) 8.6 MG tablet Take 1 tablet by mouth 2 times daily       sertraline (ZOLOFT) 50 MG tablet Take 50 mg by mouth daily           REVIEW OF SYSTEMS:  10 point ROS of systems including Constitutional, Eyes, Respiratory, Cardiovascular, Gastroenterology, Genitourinary, Integumentary, Musculoskeletal, Psychiatric were all negative except for pertinent positives noted in my HPI.    Objective:  BP (!) 144/82   Pulse 68   Temp 97.3  F (36.3  C)   Resp 16   Wt 60.1 kg (132 lb 9.6 oz)   SpO2 98%   BMI 22.07 kg/m      Exam:  GENERAL APPEARANCE:  Alert, in no distress  ENT:  Mouth and posterior oropharynx normal, moist mucous membranes, [place]  EYES:  EOM, conjunctivae, lids, pupils and irises normal, PERRL  RESP:  respiratory effort and palpation of chest normal, lungs clear to auscultation , no respiratory distress  CV:  Palpation and auscultation of heart done , regular rate and rhythm, no murmur, rub, or gallop, no edema  ABDOMEN:  normal bowel sounds, soft, nontender, no hepatosplenomegaly or other masses  M/S:   Examination of:   right upper extremity and right lower extremity  Inspection, ROM, stability and muscle strength normal and weakness LUE and LLE hx of CVA  SKIN:  Inspection of skin and subcutaneous tissue baseline  NEURO:   Cranial nerves 2-12 are normal tested and grossly at patient's baseline, speech WNL  PSYCH:  affect and mood normal    Labs:   Recent labs in [facility] reviewed by me today.     ASSESSMENT/PLAN:  Closed fracture of lumbar spine without lesion of spinal cord with routine healing, subsequent encounter  Acute/ongoing: TLSO brace on at all times when out of bed. 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 Greater than 50% of total time spent with counseling and coordinating care due to coordinating care with nurse on admission orders, coordinating care with follow up labs and appointments and counseling patient/resident for 15 minutes on: the reason for their hospitalization and what the treatment is, recent past lab and imaging results and subsequent treatment plan and current pain control plan and controlled substances ordered and taper plan of care. Did talk to daughter at bedside, discussed progress in therapy, discussed pain control, no other concerns noted   Electronically signed by:  Tonya Lynn Haase, APRN CNP

## 2019-08-22 NOTE — LETTER
8/22/2019        RE: Vanda Banks  [facility]  [address] [facility] [address]        [place] [facility]  [place] Medical Record Number:  [id number]  Place of Service where encounter took place:  [facility] (S) [[id number]]  Chief Complaint   Patient presents with     RECHECK       HPI:    Vanda Banks  is a 88 year old (10/14/1930), who is being seen today for an episodic care visit.  HPI information obtained from: facility chart records, facility staff, patient report and [facility] chart review. Today's concern is:  Right hip pain/L5 Fx unstable: on exam today patient continues to have right hip pain, pain is improving some, but ongoing, patient is working with therapy has taken up to 15 steps in parallel bars with therapy ,making slow progress.   HTN: BP as follows: 133/83, 137/80, 144/82 with HR in 60's denies CP, palpitations, admit weight was 131.3 lbs and todays weight is 127.7lbs  Hx of CVA/left hemiparesis: working with therapy    Past Medical and Surgical History reviewed in Epic today.    MEDICATIONS:  Current Outpatient Medications   Medication Sig Dispense Refill     acetaminophen (TYLENOL) 325 MG tablet Take 3 tablets (975 mg) by mouth 3 times daily       amLODIPine (NORVASC) 5 MG tablet Take 5 mg by mouth daily       baclofen (LIORESAL) 10 MG tablet TAKE 1 TABLET BY MOUTH AT BEDTIME 28 tablet 98     cloNIDine (CATAPRES) 0.1 MG tablet Take 0.1 mg by mouth 2 times daily       famotidine (PEPCID) 20 MG tablet TAKE 1 TABLET BY MOUTH ONCE DAILY 31 tablet 98     gabapentin (NEURONTIN) 100 MG capsule Take 100 mg by mouth 3 times daily        hydrOXYzine (VISTARIL) 25 MG capsule Take 25 mg by mouth every 6 hours as needed for itching       Lidocaine (LIDOCARE) 4 % Patch To Apply to lower back topically one time a day for pain put ON patch AND Apply to lower back topically one time a day for pain take OFF patch       melatonin 3 MG tablet TAKE TWO TABLETS (6MG) BY  MOUTH AT BEDTIME 56 tablet 98     metoprolol tartrate (LOPRESSOR) 25 MG tablet TAKE 1 TABLET BY MOUTH TWICE DAILY 62 tablet 98     oxyCODONE (ROXICODONE) 5 MG tablet Give 0.5 tablet by mouth every 3 hours as needed for pain 3-6 AND Give 1 tablet by mouth every 3 hours as needed for pain 7-10       polyethylene glycol (MIRALAX/GLYCOLAX) packet Take 1 packet by mouth daily as needed for constipation       sennosides (SENOKOT) 8.6 MG tablet Take 1 tablet by mouth 2 times daily       sertraline (ZOLOFT) 50 MG tablet Take 50 mg by mouth daily           REVIEW OF SYSTEMS:  10 point ROS of systems including Constitutional, Eyes, Respiratory, Cardiovascular, Gastroenterology, Genitourinary, Integumentary, Musculoskeletal, Psychiatric were all negative except for pertinent positives noted in my HPI.    Objective:  BP (!) 144/82   Pulse 68   Temp 97.3  F (36.3  C)   Resp 16   Wt 60.1 kg (132 lb 9.6 oz)   SpO2 98%   BMI 22.07 kg/m       Exam:  GENERAL APPEARANCE:  Alert, in no distress  ENT:  Mouth and posterior oropharynx normal, moist mucous membranes, [place]  EYES:  EOM, conjunctivae, lids, pupils and irises normal, PERRL  RESP:  respiratory effort and palpation of chest normal, lungs clear to auscultation , no respiratory distress  CV:  Palpation and auscultation of heart done , regular rate and rhythm, no murmur, rub, or gallop, no edema  ABDOMEN:  normal bowel sounds, soft, nontender, no hepatosplenomegaly or other masses  M/S:   Examination of:   right upper extremity and right lower extremity  Inspection, ROM, stability and muscle strength normal and weakness LUE and LLE hx of CVA  SKIN:  Inspection of skin and subcutaneous tissue baseline  NEURO:   Cranial nerves 2-12 are normal tested and grossly at patient's baseline, speech WNL  PSYCH:  affect and mood normal    Labs:   Recent labs in [facility] reviewed by me today.     ASSESSMENT/PLAN:  Closed fracture of lumbar spine without lesion of spinal cord with routine  healing, subsequent encounter  Acute/ongoing: TLSO brace on at all times when out of bed. 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 Greater than 50% of total time spent with counseling and coordinating care due to coordinating care with nurse on admission orders, coordinating care with follow up labs and appointments and counseling patient/resident for 15 minutes on: the reason for their hospitalization and what the treatment is, recent past lab and imaging results and subsequent treatment plan and current pain control plan and controlled substances ordered and taper plan of care. Did talk to daughter at bedside, discussed progress in therapy, discussed pain control, no other concerns noted   Electronically signed by:  Tonya Lynn Haase, APRN CNP             Sincerely,        Tonya Lynn Haase, APRN CNP

## 2019-09-04 ENCOUNTER — DISCHARGE SUMMARY NURSING HOME (OUTPATIENT)
Dept: GERIATRICS | Facility: CLINIC | Age: 84
End: 2019-09-04
Payer: MEDICARE

## 2019-09-04 VITALS
TEMPERATURE: 96.8 F | OXYGEN SATURATION: 96 % | BODY MASS INDEX: 22.35 KG/M2 | RESPIRATION RATE: 16 BRPM | SYSTOLIC BLOOD PRESSURE: 116 MMHG | HEART RATE: 65 BPM | WEIGHT: 134.3 LBS | DIASTOLIC BLOOD PRESSURE: 65 MMHG

## 2019-09-04 DIAGNOSIS — I69.254: ICD-10-CM

## 2019-09-04 DIAGNOSIS — F03.90 DEMENTIA WITHOUT BEHAVIORAL DISTURBANCE, UNSPECIFIED DEMENTIA TYPE: ICD-10-CM

## 2019-09-04 DIAGNOSIS — S32.009D CLOSED FRACTURE OF LUMBAR SPINE WITHOUT LESION OF SPINAL CORD WITH ROUTINE HEALING, SUBSEQUENT ENCOUNTER: Primary | ICD-10-CM

## 2019-09-04 DIAGNOSIS — Z86.73 HISTORY OF CVA (CEREBROVASCULAR ACCIDENT): ICD-10-CM

## 2019-09-04 DIAGNOSIS — I10 ESSENTIAL HYPERTENSION: ICD-10-CM

## 2019-09-04 PROCEDURE — 99310 SBSQ NF CARE HIGH MDM 45: CPT | Performed by: NURSE PRACTITIONER

## 2019-09-04 NOTE — LETTER
9/4/2019        RE: Vanda Banks  [facility]  [address] [facility] [address]        [place] GERIATRIC SERVICES DISCHARGE SUMMARY  PATIENT'S NAME: Vanda Banks  YOB: 1930  MEDICAL RECORD NUMBER:  [id number]  Place of Service where encounter took place:  [facility] [[id number]]    PRIMARY CARE PROVIDER AND CLINIC RESPONSIBLE AFTER TRANSFER:   ONEL Atkinson CNP, [address] / [facility] [address]    [facility] [place]     Transferring providers: Tonya Lynn Haase, APRN CNP, Dr. Aleksandr MD  Recent Hospitalization/ED:  [facility] stay 8/1/19 to 8/4/19.  Date of SNF Admission: August / 04 / 2019  Date of SNF (anticipated) Discharge: September / 05 / 2019  Discharged to: new assisted living for patient [facility]  Cognitive Scores: BIMS 15/15, SBT 8/28  Physical Function: Ambulating 100 ft with platform walker with SBA  DME: platform walker    CODE STATUS/ADVANCE DIRECTIVES DISCUSSION:  DNR     ALLERGIES: Azithromycin; Fenofibrate; Gemfibrozil; Levaquin [levofloxacin]; Penicillin g; Rosuvastatin; Vytorin; Bextra [valdecoxib]; Diltiazem; Hydrochlorothiazide; Sulfa antibiotics [sulfa drugs]; and Verapamil    DISCHARGE DIAGNOSIS/NURSING FACILITY COURSE:   Patient progressed very slowly to walking up to 100 feet using a Platform walker with CTG assist, needing assist of one for dressing and ADL's, assist with toileting. Patient will DC to [facility] facility [facility] with no services    Past Medical History:  has a past medical history of Cerebral infarction (H), Chronic osteoarthritis, Depressive disorder, and Hypertension.    Discharge Medications:  Current Outpatient Medications   Medication Sig Dispense Refill     acetaminophen (TYLENOL) 325 MG tablet Take 3 tablets (975 mg) by mouth 3 times daily       amLODIPine (NORVASC) 5 MG tablet Take 5 mg by mouth daily       baclofen (LIORESAL) 10 MG tablet TAKE 1 TABLET BY MOUTH AT  BEDTIME 28 tablet 98     cloNIDine (CATAPRES) 0.1 MG tablet Take 0.1 mg by mouth 2 times daily       famotidine (PEPCID) 20 MG tablet TAKE 1 TABLET BY MOUTH ONCE DAILY 31 tablet 98     gabapentin (NEURONTIN) 100 MG capsule Take 100 mg by mouth 3 times daily        Lidocaine (LIDOCARE) 4 % Patch To Apply to lower back topically one time a day for pain put ON patch AND Apply to lower back topically one time a day for pain take OFF patch       melatonin 3 MG tablet TAKE TWO TABLETS (6MG) BY MOUTH AT BEDTIME 56 tablet 98     metoprolol tartrate (LOPRESSOR) 25 MG tablet TAKE 1 TABLET BY MOUTH TWICE DAILY 62 tablet 98     oxyCODONE (ROXICODONE) 5 MG tablet Give 0.5 tablet by mouth every 3 hours as needed for pain 3-6 AND Give 1 tablet by mouth every 3 hours as needed for pain 7-10       polyethylene glycol (MIRALAX/GLYCOLAX) packet Take 1 packet by mouth daily as needed for constipation       sennosides (SENOKOT) 8.6 MG tablet Take 1 tablet by mouth 2 times daily       sertraline (ZOLOFT) 50 MG tablet Take 50 mg by mouth daily         Medication Changes/Rationale:     Did start lidocaine patch 4% to low back on AM and off Hs    Started on vistaril for pain control and neurtontin 100mg TID.     Started oxycodone 2.5mg q 3 hours prn, decrease to q 4 hours prn at discharge.     Controlled medications sent with patient:   Medication: oxycodone 2.5mg q 4 hours prn , 16 tabs given to patient at the time of discharge to take home     ROS:   10 point ROS of systems including Constitutional, Eyes, Respiratory, Cardiovascular, Gastroenterology, Genitourinary, Integumentary, Musculoskeletal, Psychiatric were all negative except for pertinent positives noted in my HPI.    Physical Exam:   Vitals: /65   Pulse 65   Temp 96.8  F (36  C)   Resp 16   Wt 60.9 kg (134 lb 4.8 oz)   SpO2 96%   BMI 22.35 kg/m     BMI= Body mass index is 22.35 kg/m .  GENERAL APPEARANCE:  Alert, in no distress  ENT:  Mouth and posterior oropharynx  normal, moist mucous membranes, [place]  EYES:  EOM, conjunctivae, lids, pupils and irises normal, PERRL  RESP:  respiratory effort and palpation of chest normal, lungs clear to auscultation , no respiratory distress  CV:  Palpation and auscultation of heart done , regular rate and rhythm, no murmur, rub, or gallop, no edema  ABDOMEN:  normal bowel sounds, soft, nontender, no hepatosplenomegaly or other masses  M/S:   Examination of:   right upper extremity and right lower extremity  Inspection, ROM, stability and muscle strength normal and weakness LUE and LLE hx of CVA  SKIN:  Inspection of skin and subcutaneous tissue baseline  NEURO:   Cranial nerves 2-12 are normal tested and grossly at patient's baseline, speech WNL  PSYCH:  affect and mood normal    SNF labs: Recent labs in [facility] reviewed by me today.       ASSESSMENT/PLAN:  Closed fracture of lumbar spine without lesion of spinal cord with routine healing, subsequent encounter  Acute/ongoing: TLSO brace on at all times when out of bed. 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, unspecified hemiplegia type (H)  MS (multiple sclerosis) (H)  Ongoing: DC to [facility], continue baclofen 10mg qhs     Hypertension, unspecified type  Ongoing:  continue lopressor 25mg BID, clonidine 0.1mg BID, norvasc 5mg QD     DISCHARGE PLAN:    Follow up labs: No labs orders/due    Medical Follow Up:      Follow up with primary care provider in 1-2 weeks    MTM referral needed and placed by this provider: No    Current [place] scheduled appointments:  Next 5 appointments (look out 90 days)    Sep 16, 2019 10:40 AM CDT  Return  Visit with Alex Grajeda PA-C  [facility]THOPEDIC SURGERY ([facility]) [address] [id number]  [phone]           Discharge Services: No therapy or home care recommended.     Discharge Instructions Verbalized to Patient at Discharge:     None      TOTAL DISCHARGE TIME:   Greater than 30 minutes  Electronically signed by:  Tonya Lynn Haase, APRN CNP                         Sincerely,        Tonya Lynn Haase, APRN CNP

## 2019-09-04 NOTE — PROGRESS NOTES
[place] GERIATRIC SERVICES DISCHARGE SUMMARY  PATIENT'S NAME: Vanda Banks  YOB: 1930  MEDICAL RECORD NUMBER:  [id number]  Place of Service where encounter took place:  [facility] [[id number]]    PRIMARY CARE PROVIDER AND CLINIC RESPONSIBLE AFTER TRANSFER:   OENL Atkinson CNP, [address] / [facility] [address]    [facility] [place]     Transferring providers: Tonya Lynn Haase, APRN CNP, Dr. Aleksandr MD  Recent Hospitalization/ED:  [facility] stay 8/1/19 to 8/4/19.  Date of SNF Admission: August / 04 / 2019  Date of SNF (anticipated) Discharge: September / 05 / 2019  Discharged to: new assisted living for patient [facility]  Cognitive Scores: BIMS 15/15, SBT 8/28  Physical Function: Ambulating 100 ft with platform walker with SBA  DME: platform walker    CODE STATUS/ADVANCE DIRECTIVES DISCUSSION:  DNR     ALLERGIES: Azithromycin; Fenofibrate; Gemfibrozil; Levaquin [levofloxacin]; Penicillin g; Rosuvastatin; Vytorin; Bextra [valdecoxib]; Diltiazem; Hydrochlorothiazide; Sulfa antibiotics [sulfa drugs]; and Verapamil    DISCHARGE DIAGNOSIS/NURSING FACILITY COURSE:   Patient progressed very slowly to walking up to 100 feet using a Platform walker with CTG assist, needing assist of one for dressing and ADL's, assist with toileting. Patient will DC to [facility] facility [facility] with no services    Past Medical History:  has a past medical history of Cerebral infarction (H), Chronic osteoarthritis, Depressive disorder, and Hypertension.    Discharge Medications:  Current Outpatient Medications   Medication Sig Dispense Refill     acetaminophen (TYLENOL) 325 MG tablet Take 3 tablets (975 mg) by mouth 3 times daily       amLODIPine (NORVASC) 5 MG tablet Take 5 mg by mouth daily       baclofen (LIORESAL) 10 MG tablet TAKE 1 TABLET BY MOUTH AT BEDTIME 28 tablet 98     cloNIDine (CATAPRES) 0.1 MG tablet Take 0.1 mg by mouth 2 times daily        famotidine (PEPCID) 20 MG tablet TAKE 1 TABLET BY MOUTH ONCE DAILY 31 tablet 98     gabapentin (NEURONTIN) 100 MG capsule Take 100 mg by mouth 3 times daily        Lidocaine (LIDOCARE) 4 % Patch To Apply to lower back topically one time a day for pain put ON patch AND Apply to lower back topically one time a day for pain take OFF patch       melatonin 3 MG tablet TAKE TWO TABLETS (6MG) BY MOUTH AT BEDTIME 56 tablet 98     metoprolol tartrate (LOPRESSOR) 25 MG tablet TAKE 1 TABLET BY MOUTH TWICE DAILY 62 tablet 98     oxyCODONE (ROXICODONE) 5 MG tablet Give 0.5 tablet by mouth every 3 hours as needed for pain 3-6 AND Give 1 tablet by mouth every 3 hours as needed for pain 7-10       polyethylene glycol (MIRALAX/GLYCOLAX) packet Take 1 packet by mouth daily as needed for constipation       sennosides (SENOKOT) 8.6 MG tablet Take 1 tablet by mouth 2 times daily       sertraline (ZOLOFT) 50 MG tablet Take 50 mg by mouth daily         Medication Changes/Rationale:     Did start lidocaine patch 4% to low back on AM and off Hs    Started on vistaril for pain control and neurtontin 100mg TID.     Started oxycodone 2.5mg q 3 hours prn, decrease to q 4 hours prn at discharge.     Controlled medications sent with patient:   Medication: oxycodone 2.5mg q 4 hours prn , 16 tabs given to patient at the time of discharge to take home     ROS:   10 point ROS of systems including Constitutional, Eyes, Respiratory, Cardiovascular, Gastroenterology, Genitourinary, Integumentary, Musculoskeletal, Psychiatric were all negative except for pertinent positives noted in my HPI.    Physical Exam:   Vitals: /65   Pulse 65   Temp 96.8  F (36  C)   Resp 16   Wt 60.9 kg (134 lb 4.8 oz)   SpO2 96%   BMI 22.35 kg/m    BMI= Body mass index is 22.35 kg/m .  GENERAL APPEARANCE:  Alert, in no distress  ENT:  Mouth and posterior oropharynx normal, moist mucous membranes, [place]  EYES:  EOM, conjunctivae, lids, pupils and irises normal,  PERRL  RESP:  respiratory effort and palpation of chest normal, lungs clear to auscultation , no respiratory distress  CV:  Palpation and auscultation of heart done , regular rate and rhythm, no murmur, rub, or gallop, no edema  ABDOMEN:  normal bowel sounds, soft, nontender, no hepatosplenomegaly or other masses  M/S:   Examination of:   right upper extremity and right lower extremity  Inspection, ROM, stability and muscle strength normal and weakness LUE and LLE hx of CVA  SKIN:  Inspection of skin and subcutaneous tissue baseline  NEURO:   Cranial nerves 2-12 are normal tested and grossly at patient's baseline, speech WNL  PSYCH:  affect and mood normal    SNF labs: Recent labs in [facility] reviewed by me today.       ASSESSMENT/PLAN:  Closed fracture of lumbar spine without lesion of spinal cord with routine healing, subsequent encounter  Acute/ongoing: TLSO brace on at all times when out of bed. 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, unspecified hemiplegia type (H)  MS (multiple sclerosis) (H)  Ongoing: DC to [facility], continue baclofen 10mg qhs     Hypertension, unspecified type  Ongoing:  continue lopressor 25mg BID, clonidine 0.1mg BID, norvasc 5mg QD     DISCHARGE PLAN:    Follow up labs: No labs orders/due    Medical Follow Up:      Follow up with primary care provider in 1-2 weeks    [facility] referral needed and placed by this provider: No    Current [place] scheduled appointments:  Next 5 appointments (look out 90 days)    Sep 16, 2019 10:40 AM CDT  Return Visit with Alex Grajeda PA-C  [facility] ([place]  [facility] [place]) [address] [address] [address]  [phone]           Discharge Services: No therapy or home care recommended.     Discharge Instructions Verbalized to Patient at Discharge:     None      TOTAL DISCHARGE TIME:   Greater than 30 minutes  Electronically signed by:  Tonya Lynn Haase, APRN CNP

## 2019-09-15 ENCOUNTER — RECORDS - HEALTHEAST (OUTPATIENT)
Dept: LAB | Facility: CLINIC | Age: 84
End: 2019-09-15

## 2019-09-15 LAB
ALBUMIN UR-MCNC: NEGATIVE MG/DL
APPEARANCE UR: CLEAR
BACTERIA #/AREA URNS HPF: ABNORMAL HPF
BILIRUB UR QL STRIP: NEGATIVE
COLOR UR AUTO: ABNORMAL
GLUCOSE UR STRIP-MCNC: NEGATIVE MG/DL
HGB UR QL STRIP: NEGATIVE
KETONES UR STRIP-MCNC: NEGATIVE MG/DL
LEUKOCYTE ESTERASE UR QL STRIP: ABNORMAL
MUCOUS THREADS #/AREA URNS LPF: ABNORMAL LPF
NITRATE UR QL: NEGATIVE
PH UR STRIP: 5.5 [PH] (ref 4.5–8)
RBC #/AREA URNS AUTO: ABNORMAL HPF
SP GR UR STRIP: 1.01 (ref 1–1.03)
SQUAMOUS #/AREA URNS AUTO: ABNORMAL LPF
UROBILINOGEN UR STRIP-ACNC: ABNORMAL
WBC #/AREA URNS AUTO: ABNORMAL HPF
WBC CLUMPS #/AREA URNS HPF: PRESENT /[HPF]

## 2019-09-16 ENCOUNTER — OFFICE VISIT (OUTPATIENT)
Dept: ORTHOPEDICS | Facility: CLINIC | Age: 84
End: 2019-09-16
Payer: MEDICARE

## 2019-09-16 ENCOUNTER — HOSPITAL ENCOUNTER (OUTPATIENT)
Dept: CT IMAGING | Facility: CLINIC | Age: 84
Discharge: HOME | End: 2019-09-16
Attending: PHYSICIAN ASSISTANT | Admitting: PHYSICIAN ASSISTANT
Payer: MEDICARE

## 2019-09-16 VITALS — SYSTOLIC BLOOD PRESSURE: 126 MMHG | DIASTOLIC BLOOD PRESSURE: 70 MMHG

## 2019-09-16 DIAGNOSIS — S32.059A CLOSED FRACTURE OF FIFTH LUMBAR VERTEBRA, UNSPECIFIED FRACTURE MORPHOLOGY, INITIAL ENCOUNTER (H): ICD-10-CM

## 2019-09-16 DIAGNOSIS — S32.050D CLOSED WEDGE COMPRESSION FRACTURE OF FIFTH LUMBAR VERTEBRA WITH ROUTINE HEALING, SUBSEQUENT ENCOUNTER: Primary | ICD-10-CM

## 2019-09-16 DIAGNOSIS — M70.61 TROCHANTERIC BURSITIS OF RIGHT HIP: ICD-10-CM

## 2019-09-16 PROCEDURE — 20610 DRAIN/INJ JOINT/BURSA W/O US: CPT | Mod: RT | Performed by: PHYSICIAN ASSISTANT

## 2019-09-16 PROCEDURE — 99213 OFFICE O/P EST LOW 20 MIN: CPT | Mod: 25 | Performed by: PHYSICIAN ASSISTANT

## 2019-09-16 PROCEDURE — 72131 CT LUMBAR SPINE W/O DYE: CPT

## 2019-09-16 RX ORDER — LIDOCAINE HYDROCHLORIDE 10 MG/ML
4 INJECTION, SOLUTION INFILTRATION; PERINEURAL
Status: DISCONTINUED | OUTPATIENT
Start: 2019-09-16 | End: 2020-06-19

## 2019-09-16 RX ORDER — TESTOSTERONE CYPIONATE 200 MG/ML
2 INJECTION INTRAMUSCULAR
Status: DISCONTINUED | OUTPATIENT
Start: 2019-09-16 | End: 2020-06-19

## 2019-09-16 RX ADMIN — TESTOSTERONE CYPIONATE 2 ML: 200 INJECTION INTRAMUSCULAR at 11:44

## 2019-09-16 RX ADMIN — LIDOCAINE HYDROCHLORIDE 4 ML: 10 INJECTION, SOLUTION INFILTRATION; PERINEURAL at 11:44

## 2019-09-16 NOTE — PATIENT INSTRUCTIONS
Continue to advance activities as tolerated.    Physical therapy for Walking, core strength and hip pain.    Follow up in 4 weeks.

## 2019-09-16 NOTE — PROGRESS NOTES
[facility]  Neurosurgery Clinic Visit      CC: low back pain    Primary care Provider: Julee Jauregui    HPI: Vanda Banks is a 88 year old female who presents for follow up of L5 fracture, DOI 8/1/19.  Pain is located in right lower back, hip and radiates down the right lateral leg at its worst. She notes tingling in the toes, no pain.  Describes the pain as naggy and tingling. Pain is worsened with standing, bending. Patient's daughter notes that she calls them due to increased pain in the right lower back and hip. Pain is alleviated with laying, sitting with left lateral bend or lean on sitting. Denies bladder/bowel incontinence. Patient's daughters note that she was transitioned to the floor from standing position 2 days ago, patient denies a fall. She states that she was not standing well, and the Aid need to lower her down.  She states she is sleeping well.   2 daughters present at time of visit.  Help with hx.  Did have stroke affecting left LE.  Seems to lean to left a lot when seated.      Current treatment: Oxycodone prn, lumbar support brace/LSO.   Current pain: 5/10 At worst: 8 /10    Past Medical History:   Diagnosis Date     Cerebral infarction (H)      Chronic osteoarthritis      Depressive disorder      Hypertension        Past Medical History reviewed with patient during visit.    Past Surgical History:   Procedure Laterality Date     BACK SURGERY       EP ABLATION SVT       Past Surgical History reviewed with patient during visit.    Current Outpatient Medications   Medication     acetaminophen (TYLENOL) 325 MG tablet     amLODIPine (NORVASC) 5 MG tablet     baclofen (LIORESAL) 10 MG tablet     cloNIDine (CATAPRES) 0.1 MG tablet     famotidine (PEPCID) 20 MG tablet     gabapentin (NEURONTIN) 100 MG capsule     Lidocaine (LIDOCARE) 4 % Patch     melatonin 3 MG tablet     metoprolol tartrate (LOPRESSOR) 25 MG tablet     oxyCODONE (ROXICODONE) 5 MG tablet      polyethylene glycol (MIRALAX/GLYCOLAX) packet     sennosides (SENOKOT) 8.6 MG tablet     sertraline (ZOLOFT) 50 MG tablet     No current facility-administered medications for this visit.        Allergies   Allergen Reactions     Azithromycin Diarrhea     Fenofibrate      Gemfibrozil      Levaquin [Levofloxacin] GI Disturbance     Penicillin G      Rosuvastatin      Vytorin      Bextra [Valdecoxib] Rash     Diltiazem Rash     Hydrochlorothiazide Rash     Sulfa Antibiotics [Sulfa Drugs] Rash     Verapamil Rash       Social History     Socioeconomic History     Marital status: Single     Spouse name: Not on file     Number of children: Not on file     Years of education: Not on file     Highest education level: Not on file   Occupational History     Not on file   Social Needs     Financial resource strain: Not on file     Food insecurity:     Worry: Not on file     Inability: Not on file     Transportation needs:     Medical: Not on file     Non-medical: Not on file   Tobacco Use     Smoking status: Never Smoker     Smokeless tobacco: Never Used   Substance and Sexual Activity     Alcohol use: Never     Frequency: Never     Drug use: Never     Sexual activity: Not on file   Lifestyle     Physical activity:     Days per week: Not on file     Minutes per session: Not on file     Stress: Not on file   Relationships     Social connections:     Talks on phone: Not on file     Gets together: Not on file     Attends [employer] service: Not on file     Active member of club or organization: Not on file     Attends meetings of clubs or organizations: Not on file     Relationship status: Not on file     Intimate partner violence:     Fear of current or ex partner: Not on file     Emotionally abused: Not on file     Physically abused: Not on file     Forced sexual activity: Not on file   Other Topics Concern     Not on file   Social History Narrative     Not on file       Family History   Problem Relation Age of Onset     Colon  Cancer Mother          in 70's     Cerebrovascular Disease Father          of stroke in 50's     Hypertension Brother      Coronary Artery Disease Brother      Hypertension Brother      Coronary Artery Disease Brother           ROS: 10 point ROS neg other than the symptoms noted above in the HPI.    Vital Signs: /70 (BP Location: Right arm, Patient Position: Chair, Cuff Size: Adult Regular)     Examination:  Constitutional:  Alert, well nourished, NAD.  HEENT: Normocephalic, atraumatic.   Pulmonary:  Without shortness of breath, normal effort.   Lymph: no lymphadenopathy to low back or LE.   Integumentary: Skin is free of rashes or lesions.   Cardiovascular:  No pitting edema of BLE.      Neurological:  Awake  Alert  Oriented x 3  Speech clear  Cranial nerves II - XII grossly intact  PERRL  EOMI  Face symmetric  Tongue midline  Motor exam     Hip Flexor:                Right: 5/5  Left:  5/5  Hip Adductor:             Right:  5/5  Left:  5/5  Hip Abductor:             Right:  5/5  Left:  5/5  Gastroc Soleus:        Right:  5/5  Left:  5/5  Tib/Ant:                      Right:  5/5  Left:  5-/5  EHL:                          Right:  5/5  Left:  5/5       Sensation, decreased left lower leg and foot, otherwise normal to bilateral lower extremities.    Reflexes are 1+ in the patellar and Achilles. There is no clonus.    Musculoskeletal:  Gait: Able to stand from a seated position.  Takes 3 steps with limp on right, needs assistance for balance. Non-myelopathic gait.  Posture, without cues leans to left with torso in chair.    Lumbar examination reveals no tenderness of the spine or paraspinous muscles. Straight leg raise is negative bilaterally.      Right Hip:  Gross: no deformities or lesions.  Lidocaine patch present over Gr. Troch.  Palpation: reproduces leg pain at posterior right Gr. Trochanter, with some pain down IT band to knee, increases with hip ABD resisted or knee extension.  PROM:  Flexion,  exte, IR/ER WNL with no pain, mild pain with resisted abd.  Ligamentous: intact.  Neuro vasculature intact.     CONTRALATERAL JOINT:  no overlying skin change, observable deformity, or effusion; range of motion as noted above; strength and function are within normal limits; no obvious ligamentous instability; reflexes, circulation and sensation grossly intact.       Imaging:     Recent Results (from the past 24 hour(s))   CT lumbar spine without contrast    Narrative    CT OF THE LUMBAR SPINE WITHOUT CONTRAST September 16, 2019 10:18 AM     HISTORY: Lumbosacral-spine fracture, traumatic. Closed fracture of  fifth lumbar vertebra, unspecified fracture morphology, initial  encounter (H).     TECHNIQUE: Axial images of the lumbar spine were acquired without  intravenous contrast. Multiplanar reformations were created from the  axial source images. Radiation dose for this scan was reduced using  automated exposure control, adjustment of the mA and/or kV according  to patient size, or iterative reconstruction technique.    COMPARISON: Lumbar spine CT 8/2/2019.    FINDINGS: There has been an interval increase in conspicuity of the  fracture line through the inferior aspect of the L5 vertebral body  with an associated interval increase in loss of L5 vertebral body  height. No other recent fractures noted. Vertebral body heights and  contours of the lumbar spine are otherwise normal. Severe degenerative  endplate spurring at all levels of the lumbar spine again noted.  Vacuum disc formation in the L5-S1 disc is again noted. Severe facet  arthropathy throughout the lumbar spine again noted. Grade 1  degenerative anterolisthesis of L4 upon L5 again noted without change.  Moderate degenerative spinal canal narrowing at the L2-L3 level again  noted.      Impression    IMPRESSION: Interval increase in conspicuity of the fracture line  through the inferior aspect of the L5 vertebral body with a slight  interval increase in loss  of L5 vertebral body height. No new  fractures noted. Severe diffuse degenerative changes of the lumbar  spine again noted.         CT of right hip 8/1/2019 reviewed, mild to moderate OA degen.    Assessment/Plan:   Vanda Banks is a 88 year old female follow up L5 endplate fx following fall.    1.  Discussed mild decrease in height, but that It could have happened anytime in the last month and is minimal.  CT does not seem to indicate neural impingement however if progresses may need MRI for evaluation. Does not exhibit traditional radicular pain, but appears to be hip in nature.      2.  Right hip Gr. Trochanteric bursitis/ IT band referral.  Does not appear to be joint OA related pain.    After discussion it was determined the hip seems to be limiting progress, does not appear to be true radicular.  Based on exam will proceed with trial right Gr. Troch bursa injection.  PT ordered for TCU: gait, core and right Gr. Troch/IT band.    Follow up 4 weeks, Lumbar X ray..       Large Joint Injection/Arthocentesis: R greater trochanteric bursa  Date/Time: 9/16/2019 11:44 AM  Performed by: Alex Grajeda PA-C  Authorized by: Alex Grajeda PA-C     Indications:  Pain  Needle Size:  25 G  Guidance: landmark guided    Approach:  Posterior  Location:  Hip      Site:  R greater trochanteric bursa  Medications:  4 mL lidocaine 1 %; 2 mL dexamethasone 120 MG/30ML  Outcome:  Tolerated well, no immediate complications  Consent Given by:  Patient  Timeout: timeout called immediately prior to procedure    Prep: patient was prepped and draped in usual sterile fashion          Alex Grajeda PA-C  [facility]

## 2019-10-17 ENCOUNTER — OFFICE VISIT (OUTPATIENT)
Dept: ORTHOPEDICS | Facility: CLINIC | Age: 84
End: 2019-10-17
Payer: MEDICARE

## 2019-10-17 ENCOUNTER — ANCILLARY PROCEDURE (OUTPATIENT)
Dept: GENERAL RADIOLOGY | Facility: CLINIC | Age: 84
End: 2019-10-17
Attending: PHYSICIAN ASSISTANT
Payer: MEDICARE

## 2019-10-17 VITALS
DIASTOLIC BLOOD PRESSURE: 78 MMHG | SYSTOLIC BLOOD PRESSURE: 110 MMHG | HEART RATE: 76 BPM | BODY MASS INDEX: 23.79 KG/M2 | HEIGHT: 63 IN | RESPIRATION RATE: 16 BRPM

## 2019-10-17 DIAGNOSIS — M16.11 ARTHRITIS OF RIGHT HIP: ICD-10-CM

## 2019-10-17 DIAGNOSIS — S32.050D CLOSED WEDGE COMPRESSION FRACTURE OF FIFTH LUMBAR VERTEBRA WITH ROUTINE HEALING, SUBSEQUENT ENCOUNTER: Primary | ICD-10-CM

## 2019-10-17 DIAGNOSIS — S32.050D CLOSED WEDGE COMPRESSION FRACTURE OF FIFTH LUMBAR VERTEBRA WITH ROUTINE HEALING, SUBSEQUENT ENCOUNTER: ICD-10-CM

## 2019-10-17 PROCEDURE — 72100 X-RAY EXAM L-S SPINE 2/3 VWS: CPT

## 2019-10-17 PROCEDURE — 99213 OFFICE O/P EST LOW 20 MIN: CPT | Performed by: PHYSICIAN ASSISTANT

## 2019-10-17 ASSESSMENT — PAIN SCALES - GENERAL: PAINLEVEL: MILD PAIN (3)

## 2019-10-17 NOTE — Clinical Note
10/17/2019         RE: Vanda Banks  [facility]  [address] [address] [facility] [address]        Dear Colleague,    Thank you for referring your patient, Vanda Banks, to the [facility] ORTHOPEDIC SURGERY. Please see a copy of my visit note below.    [facility]  Neurosurgery followup:    HPI: ***   Exam:  Constitutional:  Alert, well nourished, NAD.  HEENT: Normocephalic, atraumatic.   Pulm:  Without shortness of breath   CV:  No pitting edema of BLE.      Neurological:  Awake  Alert  Oriented x 3  Motor exam:        IP Q DF PF EHL  R   5  5   5   5    5  L   5  5   5   5    5     Reflexes are 2+ in the patellar and Achilles. There is no clonus. Downgoing Babinski.    Able to spontaneously move L/E bilaterally  Sensation intact throughout all L/E dermatomes     Incision: ***  Imaging: ***  A/P: ***  - May increase lifting restriction to *** pounds   - followup in *** with xray prior   - Call the clinic at [phone] for increasing redness, swelling or pus draining from the incision, increased pain or any other questions and concerns.      Elena Lu PA-C  [facility]  [address]  [address] [place] [address]    Tel [phone]  Pager [phone]      Again, thank you for allowing me to participate in the care of your patient.        Sincerely,        Alex Grajeda PA-C

## 2019-10-17 NOTE — PATIENT INSTRUCTIONS
Taper brace at end of the month.  Continue to progress activities as tolerated.  Ice and patch for hip pain.    Follow upin 6 weeks if needed or if symptoms progress. .

## 2019-10-17 NOTE — PROGRESS NOTES
[facility]  Neurosurgery followup:    [facility]  Neurosurgery Clinic Visit        CC: low back pain     Primary care Provider: Julee Jaureugi     HPI: Vanda Banks is a 88 year old female who presents for follow up of L5 fracture, DOI 8/1/19.  Last seen 9/16/2019 with CT scan.  Today Pain is located in right lower back, hip and radiates down the right lateral leg at its worst. Family does admit she complains less and has improved ambulation at her current residence.  She denies tingling in the toestoday.  pain moderately increased with use.   Patient's daughter notes that she has not called them due to increased pain since last visit.  She states she is sleeping well.   2 daughters present at time of visit.  Help with hx.  Did have stroke affecting left LE.     Current treatment: Oxycodone prn (decreased use lately), lidocaine patch to right hip, lumbar support brace/LSO.   Current pain: 2/10 At worst: 5/10    Neurological:  Awake  Alert  Oriented x 3  Motor exam:        IP Q DF PF EHL  R   5  5   5   5    5  L   5  5   5-   5    5     Hip abduction 5/5 with mild pain at right hip.  Palpation right hip, mild over Gr troch, none over IT band today.     Imaging:     Recent Results (from the past 24 hour(s))   XR Lumbar Spine 2/3 Views    Narrative    LUMBAR SPINE THREE VIEWS  10/17/2019 10:15 AM     HISTORY: L5 fracture. Closed wedge compression fracture of fifth  lumbar vertebra with routine healing, subsequent encounter.    COMPARISON: CT scan 9/16/2019      Impression    IMPRESSION: The L5 fracture is not well seen on these radiographs.  Scoliosis and multilevel degenerative disc disease. Arterial  calcifications.    THERESA WHITESIDE MD       A/P:   1.  No gross increase in L5 fx on imaging, pain and function improving.  2.  Condition is sum total of right hip OA, gr troch bursitis and severe lumbar DJD in setting of L5 compr fx.  Discussed this at length with family.  -  activities as tolerated.  - LSO through end of month then taper.  - continue PT and ambulation as tolerated.  - Symptomatic tx of hip and LB (patch, ice, etc.)    Follow up in 6 weeks or as needed.     Alex Grajeda PA-C  [facility].

## 2020-01-03 ENCOUNTER — RECORDS - HEALTHEAST (OUTPATIENT)
Dept: LAB | Facility: CLINIC | Age: 85
End: 2020-01-03

## 2020-01-03 LAB
ALBUMIN UR-MCNC: NEGATIVE MG/DL
APPEARANCE UR: CLEAR
BACTERIA #/AREA URNS HPF: ABNORMAL HPF
BILIRUB UR QL STRIP: NEGATIVE
COLOR UR AUTO: YELLOW
GLUCOSE UR STRIP-MCNC: NEGATIVE MG/DL
HGB UR QL STRIP: NEGATIVE
HYALINE CASTS #/AREA URNS LPF: ABNORMAL LPF
KETONES UR STRIP-MCNC: NEGATIVE MG/DL
LEUKOCYTE ESTERASE UR QL STRIP: ABNORMAL
MUCOUS THREADS #/AREA URNS LPF: ABNORMAL LPF
NITRATE UR QL: NEGATIVE
PH UR STRIP: 5.5 [PH] (ref 4.5–8)
RBC #/AREA URNS AUTO: ABNORMAL HPF
SP GR UR STRIP: 1.02 (ref 1–1.03)
SQUAMOUS #/AREA URNS AUTO: ABNORMAL LPF
UROBILINOGEN UR STRIP-ACNC: ABNORMAL
WBC #/AREA URNS AUTO: ABNORMAL HPF

## 2020-01-06 LAB
BACTERIA SPEC CULT: ABNORMAL
BACTERIA SPEC CULT: ABNORMAL

## 2020-01-11 ENCOUNTER — RECORDS - HEALTHEAST (OUTPATIENT)
Dept: LAB | Facility: CLINIC | Age: 85
End: 2020-01-11

## 2020-01-11 LAB
ALBUMIN UR-MCNC: NEGATIVE MG/DL
APPEARANCE UR: CLEAR
BILIRUB UR QL STRIP: NEGATIVE
COLOR UR AUTO: YELLOW
GLUCOSE UR STRIP-MCNC: NEGATIVE MG/DL
HGB UR QL STRIP: NEGATIVE
KETONES UR STRIP-MCNC: NEGATIVE MG/DL
LEUKOCYTE ESTERASE UR QL STRIP: NEGATIVE
NITRATE UR QL: NEGATIVE
PH UR STRIP: 5.5 [PH] (ref 4.5–8)
SP GR UR STRIP: 1.02 (ref 1–1.03)
UROBILINOGEN UR STRIP-ACNC: NORMAL

## 2020-01-13 LAB — BACTERIA SPEC CULT: NO GROWTH

## 2020-04-03 ENCOUNTER — RECORDS - HEALTHEAST (OUTPATIENT)
Dept: LAB | Facility: CLINIC | Age: 85
End: 2020-04-03

## 2020-05-05 ENCOUNTER — RECORDS - HEALTHEAST (OUTPATIENT)
Dept: LAB | Facility: CLINIC | Age: 85
End: 2020-05-05

## 2020-05-05 LAB
ALBUMIN UR-MCNC: NEGATIVE MG/DL
APPEARANCE UR: CLEAR
BILIRUB UR QL STRIP: NEGATIVE
COLOR UR AUTO: YELLOW
GLUCOSE UR STRIP-MCNC: NEGATIVE MG/DL
HGB UR QL STRIP: NEGATIVE
KETONES UR STRIP-MCNC: NEGATIVE MG/DL
LEUKOCYTE ESTERASE UR QL STRIP: NEGATIVE
NITRATE UR QL: NEGATIVE
PH UR STRIP: 5 [PH] (ref 4.5–8)
SP GR UR STRIP: 1.02 (ref 1–1.03)
UROBILINOGEN UR STRIP-ACNC: NORMAL

## 2020-05-07 LAB — BACTERIA SPEC CULT: ABNORMAL

## 2020-05-08 ENCOUNTER — RECORDS - HEALTHEAST (OUTPATIENT)
Dept: LAB | Facility: CLINIC | Age: 85
End: 2020-05-08

## 2020-05-08 LAB
ANION GAP SERPL CALCULATED.3IONS-SCNC: 9 MMOL/L (ref 5–18)
BUN SERPL-MCNC: 20 MG/DL (ref 8–28)
CALCIUM SERPL-MCNC: 9.4 MG/DL (ref 8.5–10.5)
CHLORIDE BLD-SCNC: 110 MMOL/L (ref 98–107)
CO2 SERPL-SCNC: 23 MMOL/L (ref 22–31)
CREAT SERPL-MCNC: 1.25 MG/DL (ref 0.6–1.1)
ERYTHROCYTE [DISTWIDTH] IN BLOOD BY AUTOMATED COUNT: 14.7 % (ref 11–14.5)
GFR SERPL CREATININE-BSD FRML MDRD: 40 ML/MIN/1.73M2
GLUCOSE BLD-MCNC: 90 MG/DL (ref 70–125)
HCT VFR BLD AUTO: 41.6 % (ref 35–47)
HGB BLD-MCNC: 13.2 G/DL (ref 12–16)
MCH RBC QN AUTO: 27.8 PG (ref 27–34)
MCHC RBC AUTO-ENTMCNC: 31.7 G/DL (ref 32–36)
MCV RBC AUTO: 88 FL (ref 80–100)
PLATELET # BLD AUTO: 259 THOU/UL (ref 140–440)
PMV BLD AUTO: 11 FL (ref 8.5–12.5)
POTASSIUM BLD-SCNC: 4.6 MMOL/L (ref 3.5–5)
RBC # BLD AUTO: 4.74 MILL/UL (ref 3.8–5.4)
SODIUM SERPL-SCNC: 142 MMOL/L (ref 136–145)
TSH SERPL DL<=0.005 MIU/L-ACNC: 2.27 UIU/ML (ref 0.3–5)
VIT B12 SERPL-MCNC: 673 PG/ML (ref 213–816)
WBC: 11.2 THOU/UL (ref 4–11)

## 2020-05-11 ENCOUNTER — RECORDS - HEALTHEAST (OUTPATIENT)
Dept: LAB | Facility: CLINIC | Age: 85
End: 2020-05-11

## 2020-05-15 LAB — BACTERIA SPEC CULT: ABNORMAL

## 2020-05-22 ENCOUNTER — RECORDS - HEALTHEAST (OUTPATIENT)
Dept: LAB | Facility: CLINIC | Age: 85
End: 2020-05-22

## 2020-05-22 LAB
ANION GAP SERPL CALCULATED.3IONS-SCNC: 9 MMOL/L (ref 5–18)
BUN SERPL-MCNC: 18 MG/DL (ref 8–28)
CALCIUM SERPL-MCNC: 9.4 MG/DL (ref 8.5–10.5)
CHLORIDE BLD-SCNC: 109 MMOL/L (ref 98–107)
CO2 SERPL-SCNC: 24 MMOL/L (ref 22–31)
CREAT SERPL-MCNC: 1.12 MG/DL (ref 0.6–1.1)
GFR SERPL CREATININE-BSD FRML MDRD: 46 ML/MIN/1.73M2
GLUCOSE BLD-MCNC: 93 MG/DL (ref 70–125)
POTASSIUM BLD-SCNC: 4.3 MMOL/L (ref 3.5–5)
SODIUM SERPL-SCNC: 142 MMOL/L (ref 136–145)

## 2020-06-16 ENCOUNTER — RECORDS - HEALTHEAST (OUTPATIENT)
Dept: LAB | Facility: CLINIC | Age: 85
End: 2020-06-16

## 2020-06-16 ENCOUNTER — TRANSFERRED RECORDS (OUTPATIENT)
Dept: HEALTH INFORMATION MANAGEMENT | Facility: CLINIC | Age: 85
End: 2020-06-16

## 2020-06-16 LAB
ALBUMIN UR-MCNC: NEGATIVE MG/DL
APPEARANCE UR: CLEAR
BACTERIA #/AREA URNS HPF: ABNORMAL HPF
BILIRUB UR QL STRIP: NEGATIVE
COLOR UR AUTO: ABNORMAL
GLUCOSE UR STRIP-MCNC: NEGATIVE MG/DL
HGB UR QL STRIP: NEGATIVE
KETONES UR STRIP-MCNC: NEGATIVE MG/DL
LEUKOCYTE ESTERASE UR QL STRIP: ABNORMAL
NITRATE UR QL: NEGATIVE
PH UR STRIP: 5.5 [PH] (ref 4.5–8)
RBC #/AREA URNS AUTO: ABNORMAL HPF
SP GR UR STRIP: 1 (ref 1–1.03)
SQUAMOUS #/AREA URNS AUTO: ABNORMAL LPF
UROBILINOGEN UR STRIP-ACNC: ABNORMAL
WBC #/AREA URNS AUTO: ABNORMAL HPF

## 2020-06-18 ENCOUNTER — HOSPITAL ENCOUNTER (OUTPATIENT)
Facility: CLINIC | Age: 85
Setting detail: OBSERVATION
Discharge: HOME | End: 2020-06-20
Attending: EMERGENCY MEDICINE | Admitting: INTERNAL MEDICINE
Payer: MEDICARE

## 2020-06-18 ENCOUNTER — APPOINTMENT (OUTPATIENT)
Dept: GENERAL RADIOLOGY | Facility: CLINIC | Age: 85
End: 2020-06-18
Attending: EMERGENCY MEDICINE
Payer: MEDICARE

## 2020-06-18 DIAGNOSIS — N17.9 ACUTE RENAL FAILURE, UNSPECIFIED ACUTE RENAL FAILURE TYPE (H): ICD-10-CM

## 2020-06-18 DIAGNOSIS — I10 ESSENTIAL HYPERTENSION: Primary | ICD-10-CM

## 2020-06-18 DIAGNOSIS — R65.21 SEPTIC SHOCK (H): ICD-10-CM

## 2020-06-18 DIAGNOSIS — R55 SYNCOPE, UNSPECIFIED SYNCOPE TYPE: ICD-10-CM

## 2020-06-18 DIAGNOSIS — F03.90 DEMENTIA WITHOUT BEHAVIORAL DISTURBANCE, UNSPECIFIED DEMENTIA TYPE: ICD-10-CM

## 2020-06-18 DIAGNOSIS — I69.254: ICD-10-CM

## 2020-06-18 DIAGNOSIS — I10 HYPERTENSION, UNSPECIFIED TYPE: ICD-10-CM

## 2020-06-18 DIAGNOSIS — A41.9 SEPTIC SHOCK (H): ICD-10-CM

## 2020-06-18 DIAGNOSIS — Z20.822 SUSPECTED COVID-19 VIRUS INFECTION: ICD-10-CM

## 2020-06-18 DIAGNOSIS — N39.0 URINARY TRACT INFECTION WITHOUT HEMATURIA, SITE UNSPECIFIED: ICD-10-CM

## 2020-06-18 PROBLEM — R40.20 LOSS OF CONSCIOUSNESS (H): Status: ACTIVE | Noted: 2020-06-18

## 2020-06-18 LAB
ALBUMIN SERPL-MCNC: 3.6 G/DL (ref 3.4–5)
ALBUMIN UR-MCNC: NEGATIVE MG/DL
ALP SERPL-CCNC: 97 U/L (ref 40–150)
ALT SERPL W P-5'-P-CCNC: 23 U/L (ref 0–50)
ANION GAP SERPL CALCULATED.3IONS-SCNC: 15 MMOL/L (ref 3–14)
APPEARANCE UR: CLEAR
AST SERPL W P-5'-P-CCNC: 25 U/L (ref 0–45)
BACTERIA #/AREA URNS HPF: ABNORMAL /HPF
BACTERIA SPEC CULT: ABNORMAL
BASOPHILS # BLD AUTO: 0 10E9/L (ref 0–0.2)
BASOPHILS NFR BLD AUTO: 0 %
BILIRUB DIRECT SERPL-MCNC: <0.1 MG/DL (ref 0–0.2)
BILIRUB SERPL-MCNC: 0.3 MG/DL (ref 0.2–1.3)
BILIRUB UR QL STRIP: NEGATIVE
BUN SERPL-MCNC: 23 MG/DL (ref 7–30)
CALCIUM SERPL-MCNC: 8.8 MG/DL (ref 8.5–10.1)
CHLORIDE SERPL-SCNC: 107 MMOL/L (ref 94–109)
CK SERPL-CCNC: 89 U/L (ref 30–225)
CO2 SERPL-SCNC: 17 MMOL/L (ref 20–32)
COLOR UR AUTO: ABNORMAL
CREAT SERPL-MCNC: 1.19 MG/DL (ref 0.52–1.04)
DIFFERENTIAL METHOD BLD: ABNORMAL
EOSINOPHIL # BLD AUTO: 0.4 10E9/L (ref 0–0.7)
EOSINOPHIL NFR BLD AUTO: 2 %
ERYTHROCYTE [DISTWIDTH] IN BLOOD BY AUTOMATED COUNT: 14 % (ref 10–15)
GFR SERPL CREATININE-BSD FRML MDRD: 40 ML/MIN/{1.73_M2}
GLUCOSE SERPL-MCNC: 154 MG/DL (ref 70–99)
GLUCOSE UR STRIP-MCNC: NEGATIVE MG/DL
HCT VFR BLD AUTO: 46.5 % (ref 35–47)
HGB BLD-MCNC: 14.6 G/DL (ref 11.7–15.7)
HGB UR QL STRIP: NEGATIVE
KETONES UR STRIP-MCNC: NEGATIVE MG/DL
LACTATE BLD-SCNC: 2.7 MMOL/L (ref 0.7–2)
LACTATE BLD-SCNC: 7.2 MMOL/L (ref 0.7–2)
LEUKOCYTE ESTERASE UR QL STRIP: NEGATIVE
LYMPHOCYTES # BLD AUTO: 5.1 10E9/L (ref 0.8–5.3)
LYMPHOCYTES NFR BLD AUTO: 27 %
MCH RBC QN AUTO: 27.8 PG (ref 26.5–33)
MCHC RBC AUTO-ENTMCNC: 31.4 G/DL (ref 31.5–36.5)
MCV RBC AUTO: 89 FL (ref 78–100)
MONOCYTES # BLD AUTO: 1.1 10E9/L (ref 0–1.3)
MONOCYTES NFR BLD AUTO: 6 %
MUCOUS THREADS #/AREA URNS LPF: PRESENT /LPF
NEUTROPHILS # BLD AUTO: 12.3 10E9/L (ref 1.6–8.3)
NEUTROPHILS NFR BLD AUTO: 65 %
NITRATE UR QL: NEGATIVE
PH UR STRIP: 6 PH (ref 5–7)
PLATELET # BLD AUTO: 296 10E9/L (ref 150–450)
PLATELET # BLD EST: ABNORMAL 10*3/UL
POTASSIUM SERPL-SCNC: 3.7 MMOL/L (ref 3.4–5.3)
PROT SERPL-MCNC: 7.8 G/DL (ref 6.8–8.8)
RBC # BLD AUTO: 5.25 10E12/L (ref 3.8–5.2)
RBC #/AREA URNS AUTO: <1 /HPF (ref 0–2)
RBC MORPH BLD: ABNORMAL
SARS-COV-2 PCR COMMENT: NORMAL
SARS-COV-2 RNA SPEC QL NAA+PROBE: NEGATIVE
SARS-COV-2 RNA SPEC QL NAA+PROBE: NORMAL
SODIUM SERPL-SCNC: 139 MMOL/L (ref 133–144)
SOURCE: ABNORMAL
SP GR UR STRIP: 1.01 (ref 1–1.03)
SPECIMEN SOURCE: NORMAL
SPECIMEN SOURCE: NORMAL
SQUAMOUS #/AREA URNS AUTO: <1 /HPF (ref 0–1)
TROPONIN I SERPL-MCNC: <0.015 UG/L (ref 0–0.04)
UROBILINOGEN UR STRIP-MCNC: NORMAL MG/DL (ref 0–2)
VARIANT LYMPHS BLD QL SMEAR: PRESENT
WBC # BLD AUTO: 18.9 10E9/L (ref 4–11)
WBC #/AREA URNS AUTO: <1 /HPF (ref 0–5)

## 2020-06-18 PROCEDURE — 87186 SC STD MICRODIL/AGAR DIL: CPT | Performed by: EMERGENCY MEDICINE

## 2020-06-18 PROCEDURE — 85025 COMPLETE CBC W/AUTO DIFF WBC: CPT | Performed by: EMERGENCY MEDICINE

## 2020-06-18 PROCEDURE — 87040 BLOOD CULTURE FOR BACTERIA: CPT | Performed by: EMERGENCY MEDICINE

## 2020-06-18 PROCEDURE — 96365 THER/PROPH/DIAG IV INF INIT: CPT | Mod: 59

## 2020-06-18 PROCEDURE — U0003 INFECTIOUS AGENT DETECTION BY NUCLEIC ACID (DNA OR RNA); SEVERE ACUTE RESPIRATORY SYNDROME CORONAVIRUS 2 (SARS-COV-2) (CORONAVIRUS DISEASE [COVID-19]), AMPLIFIED PROBE TECHNIQUE, MAKING USE OF HIGH THROUGHPUT TECHNOLOGIES AS DESCRIBED BY CMS-2020-01-R: HCPCS | Performed by: EMERGENCY MEDICINE

## 2020-06-18 PROCEDURE — 81001 URINALYSIS AUTO W/SCOPE: CPT | Performed by: EMERGENCY MEDICINE

## 2020-06-18 PROCEDURE — 25800030 ZZH RX IP 258 OP 636: Performed by: INTERNAL MEDICINE

## 2020-06-18 PROCEDURE — 80076 HEPATIC FUNCTION PANEL: CPT | Performed by: EMERGENCY MEDICINE

## 2020-06-18 PROCEDURE — 84484 ASSAY OF TROPONIN QUANT: CPT | Performed by: EMERGENCY MEDICINE

## 2020-06-18 PROCEDURE — 99223 1ST HOSP IP/OBS HIGH 75: CPT | Mod: AI | Performed by: INTERNAL MEDICINE

## 2020-06-18 PROCEDURE — 83605 ASSAY OF LACTIC ACID: CPT | Performed by: EMERGENCY MEDICINE

## 2020-06-18 PROCEDURE — 84145 PROCALCITONIN (PCT): CPT | Performed by: INTERNAL MEDICINE

## 2020-06-18 PROCEDURE — 82550 ASSAY OF CK (CPK): CPT | Performed by: EMERGENCY MEDICINE

## 2020-06-18 PROCEDURE — 00000146 ZZHCL STATISTIC GLUCOSE BY METER IP

## 2020-06-18 PROCEDURE — 25800030 ZZH RX IP 258 OP 636: Performed by: EMERGENCY MEDICINE

## 2020-06-18 PROCEDURE — 71045 X-RAY EXAM CHEST 1 VIEW: CPT

## 2020-06-18 PROCEDURE — 12000000 ZZH R&B MED SURG/OB

## 2020-06-18 PROCEDURE — 96361 HYDRATE IV INFUSION ADD-ON: CPT

## 2020-06-18 PROCEDURE — 36415 COLL VENOUS BLD VENIPUNCTURE: CPT

## 2020-06-18 PROCEDURE — C9803 HOPD COVID-19 SPEC COLLECT: HCPCS

## 2020-06-18 PROCEDURE — 80048 BASIC METABOLIC PNL TOTAL CA: CPT | Performed by: EMERGENCY MEDICINE

## 2020-06-18 PROCEDURE — 87086 URINE CULTURE/COLONY COUNT: CPT | Performed by: EMERGENCY MEDICINE

## 2020-06-18 PROCEDURE — 99285 EMERGENCY DEPT VISIT HI MDM: CPT | Mod: 25

## 2020-06-18 PROCEDURE — 87088 URINE BACTERIA CULTURE: CPT | Performed by: EMERGENCY MEDICINE

## 2020-06-18 PROCEDURE — 25000128 H RX IP 250 OP 636: Performed by: EMERGENCY MEDICINE

## 2020-06-18 PROCEDURE — 93005 ELECTROCARDIOGRAM TRACING: CPT

## 2020-06-18 RX ORDER — AMOXICILLIN 250 MG
2 CAPSULE ORAL 2 TIMES DAILY PRN
Status: DISCONTINUED | OUTPATIENT
Start: 2020-06-18 | End: 2020-06-20 | Stop reason: HOSPADM

## 2020-06-18 RX ORDER — NALOXONE HYDROCHLORIDE 0.4 MG/ML
.1-.4 INJECTION, SOLUTION INTRAMUSCULAR; INTRAVENOUS; SUBCUTANEOUS
Status: DISCONTINUED | OUTPATIENT
Start: 2020-06-18 | End: 2020-06-20 | Stop reason: HOSPADM

## 2020-06-18 RX ORDER — CEFTRIAXONE 1 G/1
1 INJECTION, POWDER, FOR SOLUTION INTRAMUSCULAR; INTRAVENOUS ONCE
Status: COMPLETED | OUTPATIENT
Start: 2020-06-18 | End: 2020-06-18

## 2020-06-18 RX ORDER — CEFTRIAXONE 1 G/1
1 INJECTION, POWDER, FOR SOLUTION INTRAMUSCULAR; INTRAVENOUS EVERY 24 HOURS
Status: DISCONTINUED | OUTPATIENT
Start: 2020-06-19 | End: 2020-06-18

## 2020-06-18 RX ORDER — SODIUM CHLORIDE 9 MG/ML
INJECTION, SOLUTION INTRAVENOUS CONTINUOUS
Status: DISCONTINUED | OUTPATIENT
Start: 2020-06-18 | End: 2020-06-19

## 2020-06-18 RX ORDER — LIDOCAINE 40 MG/G
CREAM TOPICAL
Status: DISCONTINUED | OUTPATIENT
Start: 2020-06-18 | End: 2020-06-20 | Stop reason: HOSPADM

## 2020-06-18 RX ORDER — ACETAMINOPHEN 325 MG/1
650 TABLET ORAL EVERY 4 HOURS PRN
Status: DISCONTINUED | OUTPATIENT
Start: 2020-06-18 | End: 2020-06-20 | Stop reason: HOSPADM

## 2020-06-18 RX ORDER — ONDANSETRON 4 MG/1
4 TABLET, ORALLY DISINTEGRATING ORAL EVERY 6 HOURS PRN
Status: DISCONTINUED | OUTPATIENT
Start: 2020-06-18 | End: 2020-06-20 | Stop reason: HOSPADM

## 2020-06-18 RX ORDER — CEFTRIAXONE 1 G/1
1 INJECTION, POWDER, FOR SOLUTION INTRAMUSCULAR; INTRAVENOUS EVERY 24 HOURS
Status: DISCONTINUED | OUTPATIENT
Start: 2020-06-19 | End: 2020-06-19

## 2020-06-18 RX ORDER — ONDANSETRON 2 MG/ML
4 INJECTION INTRAMUSCULAR; INTRAVENOUS EVERY 6 HOURS PRN
Status: DISCONTINUED | OUTPATIENT
Start: 2020-06-18 | End: 2020-06-20 | Stop reason: HOSPADM

## 2020-06-18 RX ORDER — AMOXICILLIN 250 MG
1 CAPSULE ORAL 2 TIMES DAILY PRN
Status: DISCONTINUED | OUTPATIENT
Start: 2020-06-18 | End: 2020-06-20 | Stop reason: HOSPADM

## 2020-06-18 RX ADMIN — SODIUM CHLORIDE: 9 INJECTION, SOLUTION INTRAVENOUS at 23:41

## 2020-06-18 RX ADMIN — SODIUM CHLORIDE 1000 ML: 9 INJECTION, SOLUTION INTRAVENOUS at 20:02

## 2020-06-18 RX ADMIN — SODIUM CHLORIDE 1000 ML: 9 INJECTION, SOLUTION INTRAVENOUS at 18:49

## 2020-06-18 RX ADMIN — CEFTRIAXONE 1 G: 1 INJECTION, POWDER, FOR SOLUTION INTRAMUSCULAR; INTRAVENOUS at 19:07

## 2020-06-18 ASSESSMENT — MIFFLIN-ST. JEOR
SCORE: 1049.57
SCORE: 1099.92

## 2020-06-18 NOTE — ED PROVIDER NOTES
"  History     Chief Complaint:  Altered Mental Status      The history is provided by the patient, the EMS personnel and the nursing home.      Vanda Banks is a 89 year old female with a history of stroke with left hemiplegia, CHF, and dementia who presents via EMS from assisted living for evaluation of an altered mental status after a syncopal episode. Per the assisted living staff, the patient walked up to a staff member, said \"I can't breathe\" and fainted. She was unconscious for about 15 minutes. The staff started CPR for a few minutes. The patient became responsive in the ambulance and was answering questions for EMT's. The patient was also diagnosed with a UTI today. She has a history of intracranial hemorrhage and has lasting left sided hemiplegia from this.    Allergies:  Azithromycin  Fenofibrate  Gemfibrozil  Levaquin [Levofloxacin]  Penicillin G  Rosuvastatin  Vytorin  Bextra [Valdecoxib]  Diltiazem  Hydrochlorothiazide  Sulfa Antibiotics [Sulfa Drugs]  Verapamil    Medications:    Acetaminophen   Amlodipine   Baclofen   Clonidine   Cholecalciferol   Famotidine   Gabapentin   Melatonin   Metoprolol tartrate  Oxycodone   Sennosides   Sertraline     Past Medical History:    Cataract   Cerebral infarction  Cervical radiculopathy  Chronic osteoarthritis  Congestive heart failure  Degeneration vertebral discs lumbar  Dementia   Depression  Fracture of lumbar spine without lesion of spinal cord  GERD  Hemiplegia of left side due to intracranial hemorrhage  Hyperlipidemia   Hypertension  Insomnia   Right hip pain  Spinal stenosis   Supraventricular tachycardia  Vaginal vault prolapse after hysterectomy     Past Surgical History:    Ablation SVT  Appendectomy   Back surgery  Cataract extraction   Craniotomy for subdural hematoma  Hysterectomy vaginal  Lumbar fusion     Family History:    Cerebrovascular disease  Colon cancer  Coronary artery disease  Hypertension    Social History:  Marital Status:  Single " "[1]  PCP: Blanka King  Negative for tobacco use.  Negative for alcohol use.  Negative for drug use.        Review of Systems   Unable to perform ROS: Dementia (ROS suplimented by caregiver)   Neurological: Positive for syncope.     Physical Exam     Patient Vitals for the past 24 hrs:   BP Temp Temp src Pulse Heart Rate Resp SpO2 Height Weight   06/18/20 2030 (!) 146/94 -- -- 92 95 15 97 % -- --   06/18/20 2015 138/84 -- -- 95 96 11 97 % -- --   06/18/20 2000 (!) 144/75 -- -- 96 97 14 96 % -- --   06/18/20 1945 (!) 149/83 -- -- 99 101 13 96 % -- --   06/18/20 1930 (!) 146/87 -- -- 96 96 12 98 % -- --   06/18/20 1915 (!) 139/92 -- -- 96 98 11 97 % -- --   06/18/20 1900 127/85 -- -- 101 101 17 95 % -- --   06/18/20 1845 115/76 -- -- 99 98 13 96 % -- --   06/18/20 1830 129/81 -- -- 98 99 10 96 % -- --   06/18/20 1815 123/72 -- -- 95 95 9 96 % -- --   06/18/20 1807 115/75 99.9  F (37.7  C) Temporal -- 100 14 96 % 1.676 m (5' 6\") 60.8 kg (134 lb)       Physical Exam  Constitutional: Alert, attentive  HENT:    Nose: Nose normal.    Mouth/Throat: Oropharynx is clear, mucous membranes are moist   Eyes: EOM are normal.   CV: regular rate and rhythm; no murmurs, rubs or gallups  Chest: Effort normal and breath sounds normal.   GI:  There is no tenderness. No distension. Normal bowel sounds  MSK: Normal range of motion.   Neurological: Alert, attentive; left arm and leg paresis, baseline  Skin: Skin is warm and dry.      Emergency Department Course     ECG:  Indication: Syncope  Time: 1806  Vent. Rate 98 bpm. MD interval 186. QRS duration 90. QT/QTc 378/482. P-R-T axis 78 18 26.  Sinus rhythm. Normal ECG. Read time: 1806      Imaging:  Radiology findings were communicated with the patient and caregiver who voiced understanding of the findings.    XR Chest Port 1 view:  Suboptimal ventilatory effort with secondary compressive and congestive change. No definite infiltrate although retrocardiac area is not well seen. " Degenerative disease, as per radiology.      Laboratory:  Laboratory findings were communicated with the patient who voiced understanding of the findings.    Blood Culture: In process    CBC: WBC: 18.9(H), HGB: 14.6, PLT: 296    BMP: Glucose 154(H), Carbon Dioxide: 17(L), Anion Gap: 15(H), GFR: 40(L), o/w WNL (Creatinine: 1.19(H))    1811 Troponin: <0.015     1837 Lactic acid: 7.2(H)     2041 Repeat Lactic acid: 2.7(H)      Hepatic Panel: all WNL     CK Total: 89    Symptomatic COVID-19 Virus by PCR: Resulted    SARS-CoV-2 COVID-19 Virus RT-PCR: In process    UA with Microscopic: Bacteria: few, Mucous: present, o/w WNL     Urine Culture: In process     Procedures:  None    Interventions:  1849 0.9% sodium chloride bolus, 1,000 ml, IV   1907 Ceftriaxone, 1 g, IV  2002 0.9% sodium chloride bolus, 1,000 ml, IV     Emergency Department Course:  Past medical records, nursing notes, and vitals reviewed.    1804 I performed an exam of the patient as documented above.     EKG obtained in the ED, see results above.   IV was inserted and blood was drawn for laboratory testing, results above.  The patient's nose was swabbed and this sample was sent for COVID-19 antigen, findings above.    The patient was sent for a chest x-ray while in the emergency department, results above.     2057 I consulted with Dr. Vargas of the hospitalist services who is in agreement to accept the patient for admission.     Findings and plan explained to the Patient who consents to admission. Discussed the patient with Dr. Vargas, who will admit the patient to a medical bed for further monitoring, evaluation, and treatment.    I personally reviewed the laboratory and imaging results with the Patient and caregiver and answered all related questions prior to admission.     Impression & Plan     Covid-19  Vanda Banks was evaluated during a global COVID-19 pandemic, which necessitated consideration that the patient might be at risk for  infection with the SARS-CoV-2 virus that causes COVID-19.   Applicable protocols for evaluation were followed during the patient's care.   COVID-19 was considered as part of the patient's evaluation. The plan for testing is:  a test was obtained during this visit.     CMS Diagnoses:   The patient has signs of Septic ShockThe patient has signs of septic shock as evidenced by:  1. Presence of Sepsis, AND  2. Lactic Acid level greater than or equal to 4    Time septic shock diagnosis confirmed = 1811 06/18/20   as this was the time when Lactate was resulted and the level was greater than or equal to 4    3 Hour Septic Shock Bundle Completion:  1. Initial Lactic Acid Result:   Recent Labs   Lab Test 06/18/20 2036 06/18/20 1811   LACT 2.7* 7.2*     2. Blood Cultures before Antibiotics: Yes  3. Broad Spectrum Antibiotics Administered:  yes       Anti-infectives (From admission through now)    Start     Dose/Rate Route Frequency Ordered Stop    06/18/20 1839  cefTRIAXone (ROCEPHIN) 1 g vial to attach to  mL bag for ADULTS or NS 50 mL bag for PEDS      1 g  over 15-30 Minutes Intravenous ONCE 06/18/20 1838 06/18/20 2012          4. IF patient is in septic shock within 6 hours of time zero, as defined by:  -Initial Hypotension:  2 low BP readings (SBP <90, MAP <65, or decrease > 40 from baseline due to infection) within 3 hrs of each other during the time period of 6hrs before and 6 hrs  after time zero  -Lactate > or = 4  THEN: Full 30 mL/kg bolus given (see amount below).    BMI Readings from Last 1 Encounters:   06/18/20 21.63 kg/m      30 mL/kg fluids based on weight: 1,820 mL  30 mL/kg fluids based on IBW (must be >= 60 inches tall): 1,780 mL    Septic Shock reassessment:  1. Repeat Lactic Acid Level: 2.7    Medical Decision Making:  This is a 89-year-old female with history of recent UTI who presents with unclear presentation of possible syncope, in the context of Enterococcus UTI, and with limited goals of  care.  Upon presentation and for EMS evaluation the patient was in her normal state of mentation and with a stable left-sided deficits related to a stroke.  She is alert and conversant here.  Of note, she has leukocytosis and elevated lactic acid, more so than would be expected for her otherwise stable vital signs.  This may be related to reported CPR attempts by staff at the nursing facility.  Given elevated lactic and leukocytosis, with possible underlying weakness and syncope, will plan for admission.  I discussed advanced imaging with daughter who prefers to avoid this at this time.  COVID testing is sent and negative.      Diagnosis:    ICD-10-CM   1. Urinary tract infection without hematuria, site unspecified  N39.0   2. Septic shock (H)  A41.9    R65.21   3. Syncope, unspecified syncope type  R55   4. Suspected COVID-19 virus infection  Z20.828   5. Acute renal failure, unspecified acute renal failure type (H)  N17.9       Disposition:  Admitted to a medical bed under the care of Dr. Vargas.    Scribe Disclosure:  I, Rosemary Palacios, am serving as a scribe at 6:04 PM on 6/18/2020 to document services personally performed by Antony Meyers MD based on my observations and the provider's statements to me.     [facility] EMERGENCY DEPARTMENT       Antony Meyers MD  06/18/20 [id number]       Antony Meyers MD  06/18/20 [id number]

## 2020-06-18 NOTE — ED NOTES
Bed: ED03  Expected date:   Expected time:   Means of arrival:   Comments:  Gillian de los santos F syncope AMS

## 2020-06-18 NOTE — ED NOTES
Assisted with patient triage (ambulance). Applied monitoring devices (EKG, BP, and pulse ox) onto Patient.

## 2020-06-18 NOTE — ED TRIAGE NOTES
"Pt presents vi EMS for evaluation of AMS. Pt resides at a AL facility. Walked up to staff and said \"I can't breath\" and then fainted. Per EMS, was out for about 15-20 minutes prior to their arrival. Staff started CPR for few minutes. Pt was receiving the jaw-thrust maneuver with insufficient breathing noted per EMS. Pt became verbally responsive enroute to ED and was answering questions for EMS. Hx of CVA with left sided deficits. Dx with UTI today as well. .  "

## 2020-06-19 ENCOUNTER — APPOINTMENT (OUTPATIENT)
Dept: MRI IMAGING | Facility: CLINIC | Age: 85
End: 2020-06-19
Attending: INTERNAL MEDICINE
Payer: MEDICARE

## 2020-06-19 ENCOUNTER — DOCUMENTATION ONLY (OUTPATIENT)
Dept: OTHER | Facility: CLINIC | Age: 85
End: 2020-06-19

## 2020-06-19 ENCOUNTER — APPOINTMENT (OUTPATIENT)
Dept: CARDIOLOGY | Facility: CLINIC | Age: 85
End: 2020-06-19
Attending: INTERNAL MEDICINE
Payer: MEDICARE

## 2020-06-19 PROBLEM — R55 SYNCOPE: Status: ACTIVE | Noted: 2020-06-19

## 2020-06-19 LAB
D DIMER PPP FEU-MCNC: 0.8 UG/ML FEU (ref 0–0.5)
ERYTHROCYTE [DISTWIDTH] IN BLOOD BY AUTOMATED COUNT: 14.1 % (ref 10–15)
HCT VFR BLD AUTO: 39.3 % (ref 35–47)
HGB BLD-MCNC: 12.4 G/DL (ref 11.7–15.7)
INTERPRETATION ECG - MUSE: NORMAL
MCH RBC QN AUTO: 27.1 PG (ref 26.5–33)
MCHC RBC AUTO-ENTMCNC: 31.6 G/DL (ref 31.5–36.5)
MCV RBC AUTO: 86 FL (ref 78–100)
PLATELET # BLD AUTO: 206 10E9/L (ref 150–450)
PROCALCITONIN SERPL-MCNC: <0.05 NG/ML
RBC # BLD AUTO: 4.57 10E12/L (ref 3.8–5.2)
WBC # BLD AUTO: 11.7 10E9/L (ref 4–11)

## 2020-06-19 PROCEDURE — 93306 TTE W/DOPPLER COMPLETE: CPT | Mod: 26 | Performed by: INTERNAL MEDICINE

## 2020-06-19 PROCEDURE — G0378 HOSPITAL OBSERVATION PER HR: HCPCS

## 2020-06-19 PROCEDURE — 99223 1ST HOSP IP/OBS HIGH 75: CPT | Performed by: NURSE PRACTITIONER

## 2020-06-19 PROCEDURE — 85379 FIBRIN DEGRADATION QUANT: CPT | Performed by: INTERNAL MEDICINE

## 2020-06-19 PROCEDURE — 25000132 ZZH RX MED GY IP 250 OP 250 PS 637: Mod: GY | Performed by: INTERNAL MEDICINE

## 2020-06-19 PROCEDURE — 25000128 H RX IP 250 OP 636: Performed by: INTERNAL MEDICINE

## 2020-06-19 PROCEDURE — 99225 ZZC SUBSEQUENT OBSERVATION CARE,LEVEL II: CPT | Performed by: INTERNAL MEDICINE

## 2020-06-19 PROCEDURE — 36415 COLL VENOUS BLD VENIPUNCTURE: CPT | Performed by: INTERNAL MEDICINE

## 2020-06-19 PROCEDURE — 85027 COMPLETE CBC AUTOMATED: CPT | Performed by: INTERNAL MEDICINE

## 2020-06-19 PROCEDURE — 93306 TTE W/DOPPLER COMPLETE: CPT

## 2020-06-19 PROCEDURE — 70551 MRI BRAIN STEM W/O DYE: CPT

## 2020-06-19 PROCEDURE — 96361 HYDRATE IV INFUSION ADD-ON: CPT

## 2020-06-19 PROCEDURE — 96375 TX/PRO/DX INJ NEW DRUG ADDON: CPT

## 2020-06-19 PROCEDURE — 99207 ZZC CDG-CODE CATEGORY CHANGED: CPT | Performed by: INTERNAL MEDICINE

## 2020-06-19 PROCEDURE — 25000125 ZZHC RX 250: Performed by: INTERNAL MEDICINE

## 2020-06-19 RX ORDER — AMOXICILLIN 250 MG
1 CAPSULE ORAL 2 TIMES DAILY PRN
COMMUNITY

## 2020-06-19 RX ORDER — GABAPENTIN 100 MG/1
100 CAPSULE ORAL 3 TIMES DAILY
Status: DISCONTINUED | OUTPATIENT
Start: 2020-06-19 | End: 2020-06-20 | Stop reason: HOSPADM

## 2020-06-19 RX ORDER — CALCIUM CARBONATE 500 MG/1
1000 TABLET, CHEWABLE ORAL 4 TIMES DAILY PRN
Status: DISCONTINUED | OUTPATIENT
Start: 2020-06-19 | End: 2020-06-20 | Stop reason: HOSPADM

## 2020-06-19 RX ORDER — METOPROLOL TARTRATE 25 MG/1
25 TABLET, FILM COATED ORAL 2 TIMES DAILY
Status: DISCONTINUED | OUTPATIENT
Start: 2020-06-19 | End: 2020-06-20 | Stop reason: HOSPADM

## 2020-06-19 RX ORDER — AMLODIPINE BESYLATE 5 MG/1
5 TABLET ORAL DAILY
Status: DISCONTINUED | OUTPATIENT
Start: 2020-06-19 | End: 2020-06-20 | Stop reason: HOSPADM

## 2020-06-19 RX ORDER — CALCIUM CARBONATE 500 MG/1
2 TABLET, CHEWABLE ORAL 4 TIMES DAILY PRN
COMMUNITY

## 2020-06-19 RX ORDER — ACETAMINOPHEN 500 MG
1000 TABLET ORAL EVERY 6 HOURS PRN
COMMUNITY

## 2020-06-19 RX ORDER — MULTIVIT-MIN/IRON/FOLIC ACID/K 18-600-40
1 CAPSULE ORAL DAILY
COMMUNITY

## 2020-06-19 RX ORDER — ERYTHROMYCIN 5 MG/G
0.5 OINTMENT OPHTHALMIC AT BEDTIME
Status: DISCONTINUED | OUTPATIENT
Start: 2020-06-19 | End: 2020-06-20 | Stop reason: HOSPADM

## 2020-06-19 RX ORDER — BACLOFEN 10 MG/1
10 TABLET ORAL AT BEDTIME
Status: DISCONTINUED | OUTPATIENT
Start: 2020-06-19 | End: 2020-06-20 | Stop reason: HOSPADM

## 2020-06-19 RX ORDER — FAMOTIDINE 20 MG/1
20 TABLET, FILM COATED ORAL DAILY
Status: DISCONTINUED | OUTPATIENT
Start: 2020-06-19 | End: 2020-06-20 | Stop reason: HOSPADM

## 2020-06-19 RX ORDER — GUAIFENESIN AND DEXTROMETHORPHAN HYDROBROMIDE 100; 10 MG/5ML; MG/5ML
5 SOLUTION ORAL EVERY 4 HOURS PRN
COMMUNITY

## 2020-06-19 RX ORDER — CLONIDINE HYDROCHLORIDE 0.1 MG/1
0.1 TABLET ORAL 2 TIMES DAILY
Status: DISCONTINUED | OUTPATIENT
Start: 2020-06-19 | End: 2020-06-20 | Stop reason: HOSPADM

## 2020-06-19 RX ORDER — ERYTHROMYCIN 5 MG/G
0.5 OINTMENT OPHTHALMIC AT BEDTIME
COMMUNITY

## 2020-06-19 RX ADMIN — ACETAMINOPHEN 650 MG: 325 TABLET, FILM COATED ORAL at 00:00

## 2020-06-19 RX ADMIN — SERTRALINE HYDROCHLORIDE 75 MG: 50 TABLET ORAL at 12:13

## 2020-06-19 RX ADMIN — BACLOFEN 10 MG: 10 TABLET ORAL at 21:21

## 2020-06-19 RX ADMIN — GABAPENTIN 100 MG: 100 CAPSULE ORAL at 21:21

## 2020-06-19 RX ADMIN — CLONIDINE HYDROCHLORIDE 0.1 MG: 0.1 TABLET ORAL at 21:21

## 2020-06-19 RX ADMIN — AMLODIPINE BESYLATE 5 MG: 5 TABLET ORAL at 12:13

## 2020-06-19 RX ADMIN — ERYTHROMYCIN 0.5 G: 5 OINTMENT OPHTHALMIC at 21:21

## 2020-06-19 RX ADMIN — GABAPENTIN 100 MG: 100 CAPSULE ORAL at 14:00

## 2020-06-19 RX ADMIN — ONDANSETRON 4 MG: 2 INJECTION INTRAMUSCULAR; INTRAVENOUS at 14:13

## 2020-06-19 RX ADMIN — METOPROLOL TARTRATE 25 MG: 25 TABLET, FILM COATED ORAL at 21:21

## 2020-06-19 RX ADMIN — FAMOTIDINE 20 MG: 20 TABLET, FILM COATED ORAL at 12:13

## 2020-06-19 RX ADMIN — CLONIDINE HYDROCHLORIDE 0.1 MG: 0.1 TABLET ORAL at 12:13

## 2020-06-19 ASSESSMENT — ACTIVITIES OF DAILY LIVING (ADL)
ADLS_ACUITY_SCORE: 32

## 2020-06-19 NOTE — PROGRESS NOTES
Update provided to pt daughter June regarding pt condition this morning, encouraged to call back with any further questions or concerns.  Transferred into pts room to chat with pt at end of call.

## 2020-06-19 NOTE — CONSULTS
[facility]    Palliative Care Consultation   Text Page    Date of Admission:  6/18/2020    Assessment & Plan   Vanda Banks is a 89 year old female who was admitted on 6/18/2020. I was asked to see the patient for  Goals of care and decisional support.    Recommendations:  Please see assessments below for rationale.    1.Decisional Capacity -  Unreliable. Patient has an advance directive dated 11/21/18.  Consents and decision making should be done by   Dafne Paige [phone], the primary Health Care Agent.  Alternates are  Barbara [phone]   POLST completed today.  Original with patient, copied emailed to Dafne Paige co primary health care agent (encrypted) and honoring choices  POLST indicated no re hospitalization, contacted hospice intake for informational visit   2. Pain- no pain    Continue to monitor    Acetaminophen (Tylenol) every 4 hrs prn pain    BACLOFEN  10 mg at hs spasms LUE      3. Spiritual Care- Oriented to Spiritual Health as part of Palliative Care team.    4. Care Planning-  TO BE DETERMINED likely discharge to [facility] with home care vs hospice     GOALS of CARE: Restorative with selective treatment avoid future hospitalization  CODE STATUS: DNR/DNI     Findings & plan of care discussed with: Bedside Nurse Ivonne Beaver RN   Thank you for involving us in the patient's care.     Ramona Roper, HYACINTH-C,APRN,CNP, ACHPN   Pain Management and Palliative Care  [facility]  Pgr: [phone]    Time Spent on this Encounter   Total unit/floor time  90    minutes, time consisted of the following, examination of the patient, reviewing the record and completing documentation. >50% of time spent in counseling and coordination of care.  Time spend counseling with patient and family consisted of the following topics, goals of care, advance care planning, care planning for discharge and symptom management.  Time spent in coordination of  "care with team members as listed above.       Palliative Care Assessment:  Vanda Banks is a 89 year old patient admitted with symptoms of syncope and possible arrest with CPR in the field of unclear etiology.  The patient is not a reliable historian, however she is able to tell me that she may have \"passed out\" sometime she is dizziness and light headed if she gets up too fast. She also recall \"big picture\" items that she has a stroke and that she lives in assisted living.  She acknowledges she needs some help with dressing, bathing and walks with a cane and left leg brace.  Her meals are provided and has a good appetite.  She admits she ortiz get things mixed up and could not recall the date , but stated she is at [facility].  Her family health care agents would prefer no future hospitalizations and inquired about hospice.   This is in the setting of cardiomyopathy, hx of right CVA with left hemiparesis depression, hypertension.  She has no had recurrent admissions, decline in function or weight loss.     Symptoms:   Syncope    Social:            Living situation:  Assisted living [facility]        Support system:  Daughters        Actual/Potential Caregiver:  Assisted living        Functional status:  No recent decline        Financial concerns:  None        Substance use disorder (past / present):  No        Occupation:         Hobbies:      Mental Health:    hx of depression in remission     Coping:   Support of family     Spiritual/Quaker:    Spiritual background: [employer]   Spiritual needs:  None   Sense/Meaning Making:      Prognostic Information:  This has not been discussed with patient or family .    Advance Care Planning:           Decision making capacity:  unrelaible       Disease understanding:  NA       Goals of Care:  Restorative, no tube feedings avoid future hospitalization        Preferred way of decision making:  Ovi beltran as co primary [facility]       Health care directive:  Yes        " Health care agent:  Daughter Dafne Lira        Code Status:  DNR / DNI       POLST Physician orders for life-sustaining treatment (POLST) form is completed today       History of Present Illness   History is obtained from the patient, electronic health record and patient's daughters    Vanda Banks is a 89 year old female who presents with past medical history of CVA in 2018 with severe left hemiparesis, HTN, depression, osteoarthritis, She resides in a care suite at [facility] assisted living facility receiving maximal care.  She had a sudden onset of syncope with possible arrest.  She has out of hospital CPR and then brought to the ED.  She was being treated for a UTI and there was also concern for sepsis related complication.  She denies fever, chills,  headache,dizziness, light headedness, chest pain, SOB, cough, changes in appetite, diarrhea, constipation or weakness. At baseline she ambulates with a cane and a brace for her left leg.  Today she is using the Celina stedy.       Decision-Making & Goals of Care Discussion:  Discussed on June 19, 2020 with NARCISA MatuteC,APRN,CNP,ACHPN I met with the patient at the bedside.  She was awake alert and disoriented, but pleasant.  She has vague and inaccurate accounts for events leading to admission  I contacted the daughter Dafne Paige and she was able to conference call with her sister Barbara Lira for us to talk.  They were updated on the patients condition.  We reviewed the plan of care and inquiry was made into future desires for hospitalizations.  Thye both prefer to avoid future admissions, but the aide at the [facility] was unable to find the POLST.  We reviewed the POLST and a new one was executed.  Dafne tells this writer that he mom is at maximum care a the [facility].  She inquired about hospice. I called   Hospice to have see an informational visit could be scheduled.  I relayed this to Dafne Paige.        Past Medical History   I have reviewed  this patient's medical history and updated it with pertinent information if needed.   Past Medical History:   Diagnosis Date     Cerebral infarction (H)      Chronic osteoarthritis      Depressive disorder      Hypertension        Past Surgical History   I have reviewed this patient's surgical history and updated it with pertinent information if needed.  Past Surgical History:   Procedure Laterality Date     BACK SURGERY       EP ABLATION SVT         Prior to Admission Medications   Prior to Admission Medications   Prescriptions Last Dose Informant Patient Reported? Taking?   Dextromethorphan-guaiFENesin  MG/5ML syrup More than a month at Unknown time  Yes No   Sig: Take 5 mLs by mouth every 4 hours as needed for cough   Vitamin D, Cholecalciferol, 25 MCG (1000 UT) TABS 6/18/2020 at Unknown time  Yes Yes   Sig: Take 1 tablet by mouth daily   acetaminophen (TYLENOL) 500 MG tablet Past Month at Unknown time  Yes Yes   Sig: Take 1,000 mg by mouth every 6 hours as needed for mild pain   amLODIPine (NORVASC) 5 MG tablet 6/18/2020 at Unknown time  Yes Yes   Sig: Take 5 mg by mouth daily   baclofen (LIORESAL) 10 MG tablet Past Week at Unknown time  No Yes   Sig: TAKE 1 TABLET BY MOUTH AT BEDTIME   calcium carbonate (TUMS) 500 MG chewable tablet More than a month at Unknown time  Yes No   Sig: Take 2 chew tab by mouth 4 times daily as needed for heartburn   cloNIDine (CATAPRES) 0.1 MG tablet 6/18/2020 at Unknown time  Yes Yes   Sig: Take 0.1 mg by mouth 2 times daily   erythromycin (ROMYCIN) 5 MG/GM ophthalmic ointment 6/17/2020 at Unknown time  Yes Yes   Sig: Place 0.5 inches Into the left eye At Bedtime   famotidine (PEPCID) 20 MG tablet 6/18/2020 at Unknown time  No Yes   Sig: TAKE 1 TABLET BY MOUTH ONCE DAILY   gabapentin (NEURONTIN) 100 MG capsule 6/18/2020 at Unknown time  Yes Yes   Sig: Take 100 mg by mouth 3 times daily    metoprolol tartrate (LOPRESSOR) 25 MG tablet 6/18/2020 at Unknown time  No Yes   Sig:  TAKE 1 TABLET BY MOUTH TWICE DAILY   polyethylene glycol (MIRALAX/GLYCOLAX) packet Past Month at Unknown time  Yes Yes   Sig: Take 1 packet by mouth daily as needed for constipation   senna-docusate (SENOKOT-S/PERICOLACE) 8.6-50 MG tablet Past Month at Unknown time  Yes Yes   Sig: Take 1 tablet by mouth 2 times daily as needed for constipation   sertraline (ZOLOFT) 50 MG tablet 2020 at Unknown time  Yes Yes   Sig: Take 75 mg by mouth daily       Facility-Administered Medications: None     Allergies   Allergies   Allergen Reactions     Azithromycin Diarrhea     Fenofibrate      Gemfibrozil      Levaquin [Levofloxacin] GI Disturbance     Penicillin G      Rosuvastatin      Vytorin      Bextra [Valdecoxib] Rash     Diltiazem Rash     Hydrochlorothiazide Rash     Sulfa Antibiotics [Sulfa Drugs] Rash     Verapamil Rash         Family History   I have reviewed this patient's family history and updated it with pertinent information if needed.   Family History   Problem Relation Age of Onset     Colon Cancer Mother          in 70's     Cerebrovascular Disease Father          of stroke in 50's     Hypertension Brother      Coronary Artery Disease Brother      Hypertension Brother      Coronary Artery Disease Brother        Review of Systems   The 10 point Review of Systems is negative other than noted in the HPI or here.     Palliative Symptom Review (0=no symptom/no concern, 1=mild, 2=moderate, 3=severe):      Pain: 0-none      Fatigue: 1-mild      Nausea: 0-none      Constipation: 0-none      Diarrhea: 0-none      Depressive Symptoms: 0-none      Anxiety: 0-none      Drowsiness: 0-none      Poor Appetite: 0-none      Shortness of Breath: 0-none      Insomnia: 0-none      Other: edema  0-none      Overall (0 good/no concerns, 3 very poor):   2    Physical Exam   Temp:  [98.9  F (37.2  C)-99.9  F (37.7  C)] 99.4  F (37.4  C)  Pulse:  [] 91  Heart Rate:  [] 89  Resp:  [9-18] 16  BP: (115-155)/(72-94)  146/81  SpO2:  [95 %-98 %] 96 %  145 lbs 1.6 oz  GEN:  Alert, oriented x 2 appears comfortable, NAD.  HEENT:  Normocephalic/atraumatic, no scleral icterus, no nasal discharge, mouth moist.  CV:  RRR, S1, S2; no murmurs or other irregularities noted.  +3 DP/PT pulses bilatererally; no edema BLE.  RESP:  Clear to auscultation bilaterally without rales/rhonchi/wheezing/retractions.  Symmetric chest rise on inhalation noted.  Normal respiratory effort.  ABD:  Rounded, soft, non-tender/non-distended.  +BS  EXT:  Edema & pulses as noted above.  CMS intact x 4.     M/S:   Nontender to palpation, left arm spastic paresis.  BRITTON x 2+  SKIN:  Dry to touch, no exanthems noted in the visualized areas.    NEURO: left arm spastic contractures paresis, left leg 3+/5  Sensation to touch intact all extremities.   There is no area of allodynia or hyperesthesia.  Psych:  Normal affect.  Calm, cooperative, conversant appropriately.     Delirium Screen/CAM:  Delirium = (#1 and #2 = YES) + (#3 and/or #4)   1) Acute onset and fluctuating course:   No   (acute change in mental status from baseline over last 24 hours)  2) Inattention:   YES   (difficulty focusing, distractible, can't follow conversation)  3) Disorganized thinking:   YES   (score only if #1 and #2 are YES)  (rambling/irrelevant conversation, unclear/illogical thoughts, inconsistency)  4) Altered level of consciousness:   No   (score only if #1 and #2 are YES)  (other than alert, calm, cooperative)    Delirium/CAM score: 2/4  Interpretation:  1)  Delirium:  Absent  2)  Type:  hypoactive  3)  Severity:  moderate    Data   Results for orders placed or performed during the hospital encounter of 06/18/20 (from the past 24 hour(s))   EKG 12-lead, tracing only   Result Value Ref Range    Interpretation ECG Click View Image link to view waveform and result    CBC + differential   Result Value Ref Range    WBC 18.9 (H) 4.0 - 11.0 10e9/L    RBC Count 5.25 (H) 3.8 - 5.2 10e12/L     Hemoglobin 14.6 11.7 - 15.7 g/dL    Hematocrit 46.5 35.0 - 47.0 %    MCV 89 78 - 100 fl    MCH 27.8 26.5 - 33.0 pg    MCHC 31.4 (L) 31.5 - 36.5 g/dL    RDW 14.0 10.0 - 15.0 %    Platelet Count 296 150 - 450 10e9/L    Diff Method Manual Differential     % Neutrophils 65.0 %    % Lymphocytes 27.0 %    % Monocytes 6.0 %    % Eosinophils 2.0 %    % Basophils 0.0 %    Absolute Neutrophil 12.3 (H) 1.6 - 8.3 10e9/L    Absolute Lymphocytes 5.1 0.8 - 5.3 10e9/L    Absolute Monocytes 1.1 0.0 - 1.3 10e9/L    Absolute Eosinophils 0.4 0.0 - 0.7 10e9/L    Absolute Basophils 0.0 0.0 - 0.2 10e9/L    Reactive Lymphs Present     RBC Morphology Consistent with reported results     Platelet Estimate       Automated count confirmed.  Platelet morphology is normal.   Basic metabolic panel (BMP)   Result Value Ref Range    Sodium 139 133 - 144 mmol/L    Potassium 3.7 3.4 - 5.3 mmol/L    Chloride 107 94 - 109 mmol/L    Carbon Dioxide 17 (L) 20 - 32 mmol/L    Anion Gap 15 (H) 3 - 14 mmol/L    Glucose 154 (H) 70 - 99 mg/dL    Urea Nitrogen 23 7 - 30 mg/dL    Creatinine 1.19 (H) 0.52 - 1.04 mg/dL    GFR Estimate 40 (L) >60 mL/min/[1.73_m2]    GFR Estimate If Black 47 (L) >60 mL/min/[1.73_m2]    Calcium 8.8 8.5 - 10.1 mg/dL   Troponin I   Result Value Ref Range    Troponin I ES <0.015 0.000 - 0.045 ug/L   Lactic acid whole blood   Result Value Ref Range    Lactic Acid 7.2 (HH) 0.7 - 2.0 mmol/L   Hepatic panel   Result Value Ref Range    Bilirubin Direct <0.1 0.0 - 0.2 mg/dL    Bilirubin Total 0.3 0.2 - 1.3 mg/dL    Albumin 3.6 3.4 - 5.0 g/dL    Protein Total 7.8 6.8 - 8.8 g/dL    Alkaline Phosphatase 97 40 - 150 U/L    ALT 23 0 - 50 U/L    AST 25 0 - 45 U/L   CK total   Result Value Ref Range    CK Total 89 30 - 225 U/L   Blood culture    Specimen: Blood    Right Arm   Result Value Ref Range    Specimen Description Blood Right Arm     Culture Micro No growth after 16 hours    Symptomatic COVID-19 Virus (Coronavirus) by PCR    Specimen:  Nasopharyngeal   Result Value Ref Range    COVID-19 Virus PCR to [facility] - Source Nasopharyngeal     COVID-19 Virus PCR to [facility] - Result       Test received-See reflex to IDDL test SARS CoV2 (COVID-19) Virus RT-PCR   Blood culture    Specimen: Blood    Left Arm   Result Value Ref Range    Specimen Description Blood Left Arm     Culture Micro No growth after 16 hours    SARS-CoV-2 COVID-19 Virus (Coronavirus) RT-PCR Nasopharyngeal    Specimen: Nasopharyngeal   Result Value Ref Range    SARS-CoV-2 Virus Specimen Source Nasopharyngeal     SARS-CoV-2 PCR Result NEGATIVE     SARS-CoV-2 PCR Comment       Testing was performed using the Xpert Xpress SARS-CoV-2 Assay on the Cepheid Gene-Xpert   Instrument Systems. Additional information about this Emergency Use Authorization (EUA)   assay can be found via the Lab Guide.     XR Chest Port 1 View    Narrative    EXAM: XR CHEST PORT 1 VW  LOCATION: [facility]  DATE/TIME: 6/18/2020 6:44 PM    INDICATION: Dyspnea  COMPARISON: None.      Impression    IMPRESSION: Suboptimal ventilatory effort with secondary compressive and congestive change. No definite infiltrate although retrocardiac area is not well seen. Degenerative disease.   Lactic acid whole blood   Result Value Ref Range    Lactic Acid 2.7 (H) 0.7 - 2.0 mmol/L   UA with Microscopic   Result Value Ref Range    Color Urine Straw     Appearance Urine Clear     Glucose Urine Negative NEG^Negative mg/dL    Bilirubin Urine Negative NEG^Negative    Ketones Urine Negative NEG^Negative mg/dL    Specific Gravity Urine 1.007 1.003 - 1.035    Blood Urine Negative NEG^Negative    pH Urine 6.0 5.0 - 7.0 pH    Protein Albumin Urine Negative NEG^Negative mg/dL    Urobilinogen mg/dL Normal 0.0 - 2.0 mg/dL    Nitrite Urine Negative NEG^Negative    Leukocyte Esterase Urine Negative NEG^Negative    Source Catheterized Urine     WBC Urine <1 0 - 5 /HPF    RBC Urine <1 0 - 2 /HPF    Bacteria Urine Few (A) NEG^Negative /HPF     Squamous Epithelial /HPF Urine <1 0 - 1 /HPF    Mucous Urine Present (A) NEG^Negative /LPF   Urine Culture Aerobic Bacterial    Specimen: Catheterized Urine   Result Value Ref Range    Specimen Description Catheterized Urine     Special Requests Specimen received in preservative     Culture Micro (A)      10,000 to 50,000 colonies/mL  Enterococcus faecalis      Culture Micro Susceptibility testing in progress    Procalcitonin   Result Value Ref Range    Procalcitonin <0.05 ng/ml   Social Work IP Consult    Narrative    Lilly Hardy BSW     6/19/2020  1:42 PM  Care Transition Initial Assessment - SW     Met with: Spoke with daughter June via phone. Spoke with STEVE Espinoza at [facility], [phone].  Active Problems:    Loss of consciousness (H)    Syncope       DATA  Lives With: facility resident      Quality of Family Relationships: helpful, involved, supportive  Description of Support System: Supportive, Involved  Who is your support system?: Children, Facility resident(s)/Staff  Support Assessment: Adequate family and caregiver support,   Adequate social supports.   Identified issues/concerns regarding health management: Pt   admitted under observation for syncope. Pt had a recent UTI and   has a history of stroke. Pt resides at [facility]. Spoke with STEVE Espinoza at [facility] to confirm. Palliative   consulted for goals of care discussion. Hospice info session   pending medical workup per daughter June.      Quality of Family Relationships: helpful, involved, supportive     ASSESSMENT  Cognitive Status:  Awake and alert.  Concerns to be addressed: Discharge planning.     PLAN  Patient anticipates discharging to:  Return to [facility] at discharge, likely in 1-2 days. Facility is able to   take pt back over the weekend. Weekend # [phone], (F)   [phone]. Transport TBD. Hospice info session pending medical   workup. SW to discuss transport further with dtr Dafne  tomorrow.     REGINALDO Rodriguez   Inpatient Care Coordination  [facility]   [phone]       CBC with platelets   Result Value Ref Range    WBC 11.7 (H) 4.0 - 11.0 10e9/L    RBC Count 4.57 3.8 - 5.2 10e12/L    Hemoglobin 12.4 11.7 - 15.7 g/dL    Hematocrit 39.3 35.0 - 47.0 %    MCV 86 78 - 100 fl    MCH 27.1 26.5 - 33.0 pg    MCHC 31.6 31.5 - 36.5 g/dL    RDW 14.1 10.0 - 15.0 %    Platelet Count 206 150 - 450 10e9/L   D dimer quantitative   Result Value Ref Range    D Dimer 0.8 (H) 0.0 - 0.50 ug/ml FEU   Echocardiogram Complete    Narrative    [id number]  [id number]  [id number]  [id number]^MARK^LEANNE^[facility]  Echocardiography Laboratory  [address] [place] [address]        Name: CRISTY HUNTER  MRN: [id number]  : 10/14/1930  Study Date: 2020 01:18 PM  Age: 89 yrs  Gender: Female  Patient Location: [facility]  Reason For Study: Syncope  Ordering Physician: LEANNE FLORES  Referring Physician: Blanka King  Performed By: Jessica Paige RDCS     BSA: 1.7 m2  Height: 66 in  Weight: 145 lb  HR: 104  BP: 146/81 mmHg  _____________________________________________________________________________  __        Procedure  Complete Portable Echo Adult.  _____________________________________________________________________________  __        Interpretation Summary     A cardiac structural cause for syncope was not identified.  Sinus rhythm was noted.  Hyperdynamic left ventricular function  The visual ejection fraction is estimated at >70%.  The left ventricle is normal in size.  There is mild concentric left ventricular hypertrophy.  The right ventricle is normal in structure, function and size.  Right ventricular systolic pressure is elevated, consistent with mild  pulmonary hypertension.  Doppler interrogation does not demonstrate significant stenosis or  insufficiency involving cardiac valves     No old studies available for  comparison.  _____________________________________________________________________________  __        Left Ventricle  The left ventricle is normal in size. There is mild concentric left  ventricular hypertrophy. Hyperdynamic left ventricular function. The visual  ejection fraction is estimated at >70%. Grade I or early diastolic  dysfunction. No regional wall motion abnormalities noted. There is no thrombus  seen in the left ventricle.     Right Ventricle  The right ventricle is normal in structure, function and size. There is no  mass or thrombus in the right ventricle.     Atria  Normal left atrial size. Right atrial size is normal. There is no atrial shunt  seen. The left atrial appendage is not well visualized.     Mitral Valve  The mitral valve leaflets appear normal. There is no evidence of stenosis,  fluttering, or prolapse. There is no mitral regurgitation noted. There is no  mitral valve stenosis.        Tricuspid Valve  Normal tricuspid valve. The right ventricular systolic pressure is elevated at  32.9 mmHg. Right ventricular systolic pressure is elevated, consistent with  mild pulmonary hypertension. There is no tricuspid stenosis.     Aortic Valve  The aortic valve is trileaflet. No aortic regurgitation is present. No aortic  stenosis is present.     Pulmonic Valve  Normal pulmonic valve. There is no pulmonic valvular regurgitation. There is  no pulmonic valvular stenosis.     Vessels  The aortic root is normal size. Normal size ascending aorta. The inferior vena  cava is normal. The pulmonary artery is normal size.     Pericardium  The pericardium appears normal. There is no pleural effusion.        Rhythm  Sinus rhythm was noted.  _____________________________________________________________________________  __  MMode/2D Measurements & Calculations  IVSd: 1.4 cm     LVIDd: 3.3 cm  LVIDs: 2.2 cm  LVPWd: 0.90 cm  FS: 35.3 %  LV mass(C)d: 115.3 grams  LV mass(C)dI: 66.1 grams/m2  Ao root diam: 3.6 cm  LA  dimension: 2.7 cm  asc Aorta Diam: 3.5 cm  LA/Ao: 0.76  LA Volume (BP): 33.6 ml  LA Volume Index (BP): 19.3 ml/m2  RWT: 0.54           Doppler Measurements & Calculations  MV E max constantin: 86.8 cm/sec  MV A max constantin: 105.7 cm/sec  MV E/A: 0.82  MV max P.2 mmHg  MV mean PG: 3.2 mmHg  MV V2 VTI: 19.8 cm  MV P1/2t max constantin: 94.7 cm/sec  MV P1/2t: 90.2 msec  MVA(P1/2t): 2.4 cm2  MV dec slope: 307.6 cm/sec2  MV dec time: 0.24 sec  LV V1 max P.3 mmHg  LV V1 max: 125.1 cm/sec  LV V1 VTI: 23.8 cm  PA acc time: 0.08 sec  TR max constantin: 287.0 cm/sec  TR max P.9 mmHg  E/E' av.4  Lateral E/e': 16.5  Medial E/e': 12.3              _____________________________________________________________________________  __        Report approved by: Dr. Luis Alberto Tate 2020 02:50 PM

## 2020-06-19 NOTE — PROGRESS NOTES
"SPIRITUAL HEALTH SERVICES Progress Note  [facility] 6    Spiritual Health Services visit per routine admission hospital  request.  Pt is alert and pleasant. Vanda states she is \"very Roman Catholic\" and welcomed a prayer as she awaits MRI transport.  Oriented to Spiritual Health Services and availability during hospital stay.      Lb Dunlap MA  Staff   Pager: [phone]  Phone: [phone]         "

## 2020-06-19 NOTE — H&P
[facility]  Hospitalist Admission Note  Name: Vanda Banks    MRN: [id number]  YOB: 1930    Age: 89 year old  Date of admission: 6/18/2020  Primary care provider: Blanka King    Chief Complaint: Loss of consciousness, out of hospital CPR    Vanda Banks is a 89 year old female with PMH including prior CVA with residual left-sided deficits who resides in a care facility, hypertension, depression who presents with loss of consciousness.  History is limited as the patient cannot provide any history herself aside from answering questions regarding her immediate state of being.  Apparently at her assisted living in [place] she told staff that she felt she cannot breathe and fainted.  She was not responsive and apparently they could not feel pulses and actually started CPR for a few minutes.  EMS arrived and apparently she was somewhat lethargic but clearly had a pulse and was able to answer questions for EMTs.  She was diagnosed with a UTI a couple of days ago with culture results showing pansensitive Enterobacter.    Notably her daughter is Dafne Paige, who works with the cardiology group here.  I did call Dafne and she indicates to me that Vanda is actually supposed to be DNR/DNI and she would like to limit the aggressiveness of our work-up and is unsure if her mother even should be hospitalized moving forward.  At this point she agrees that we should admit her for IV fluids, empiric antibiotics and monitoring overnight but not to accelerate cares or aggressively imaging or further work-up tonight's event.    I note that work-up in the ER was initially notable for lactic acid of 7.2, white blood count of 18.9 but urine analysis was negative.  She was given IV fluids and a dose of ceftriaxone empirically and repeat lactic acid was 2.7.  CXR was negative but a suboptimal study.      Assessment and Plan:   1. Loss of consciousness, out of hospital CPR: It is unclear to me if she ever  actually lost pulses.  EKG shows normal sinus rhythm.  This may have simply been a syncopal event but does not clear.  In any case, goals of care are not compatible with an aggressive work-up here so will admit for IV hydration, empiric ceftriaxone and repeat some lab work in the morning.  --Monitor on telemetry overnight  --IV hydration  --Empiric IV ceftriaxone, low threshold to discontinue antibiotics if afebrile  --Asymptomatic covid swab negative  --Social work consult to help with disposition planning  --Palliative care consult to help define goals of care and consider do not rehospitalize directive    2.   Lactic acidosis, leukocytosis: Does have a low-grade fever in the ER but no focal infectious symptoms otherwise.  Chest x-ray negative.  Urinalysis remarkably negative.  --Blood cultures obtained in the ER  --Continue empiric ceftriaxone for now  --Recheck white count in the morning  --Check procalcitonin, monitor for fever or other focal signs of infection.  She is at some aspiration risk.  --Consider de-escalating antibiotics tomorrow.  --Penicillin allergy noted    3.   Prior history of CVA with residual left-sided deficits: Suspect at least some mild ongoing encephalopathy.  Not quite yet back at her baseline after tonight's events.  --We will request dysphagia screen is done  --Anticipate restarting home medications once verified and pending dysphagia screen    4.  Recent diagnosis of Enterococcus UTI: Unclear what she was being treated with.  Await records/med rec.  Documentation from the 16th appears to show that this was sensitive to vancomycin, ampicillin and nitrofurantoin but in the setting of penicillin allergy I doubt she was treated with ampicillin.  Urine analysis here completely bland so I do not think we need to direct antibiotics over that.      DVT Prophylaxis: Pneumatic Compression Devices  Code Status: DNR / DNI, discussed with her daughter personally.  Discharge Dispo: Family's goal is  to get her back to her care facility.  Social work will be consulted.  Palliative care will be consulted for consideration of possible do not rehospitalize plan and/or discussion of hospice      History of Present Illness:  Vanda Banks is a 89 year old female with PMH including prior CVA with residual left-sided deficits who resides in a care facility, hypertension, depression who presents with loss of consciousness.  History is limited as the patient cannot provide any history herself aside from answering questions regarding her immediate state of being.  Apparently at her assisted living in [place] she told staff that she felt she cannot breathe and fainted.  She was not responsive and apparently they could not feel pulses and actually started CPR for a few minutes.  EMS arrived and apparently she was somewhat lethargic but clearly had a pulse and was able to answer questions for EMTs.  She was diagnosed with a UTI a couple of days ago with culture results showing pansensitive Enterobacter.    During my interaction with Vanda she was hemodynamically stable and able to tell me she is not in pain, does not feel short of breath but does seem somewhat confused.     Past Medical History:  Past Medical History:   Diagnosis Date     Cerebral infarction (H)      Chronic osteoarthritis      Depressive disorder      Hypertension      Past Surgical History:  Past Surgical History:   Procedure Laterality Date     BACK SURGERY       EP ABLATION SVT       Social History:  Social History     Tobacco Use     Smoking status: Never Smoker     Smokeless tobacco: Never Used   Substance Use Topics     Alcohol use: Never     Frequency: Never     Social History     Social History Narrative     Not on file     Family History:  Family History   Problem Relation Age of Onset     Colon Cancer Mother          in 70's     Cerebrovascular Disease Father          of stroke in 50's     Hypertension Brother      Coronary Artery  Disease Brother      Hypertension Brother      Coronary Artery Disease Brother      Allergies:  Allergies   Allergen Reactions     Azithromycin Diarrhea     Fenofibrate      Gemfibrozil      Levaquin [Levofloxacin] GI Disturbance     Penicillin G      Rosuvastatin      Vytorin      Bextra [Valdecoxib] Rash     Diltiazem Rash     Hydrochlorothiazide Rash     Sulfa Antibiotics [Sulfa Drugs] Rash     Verapamil Rash     Medications:  dexamethasone (DECADRON) injection 2 mL  lidocaine 1 % injection 4 mL    acetaminophen (TYLENOL) 325 MG tablet, Take 3 tablets (975 mg) by mouth 3 times daily  amLODIPine (NORVASC) 5 MG tablet, Take 5 mg by mouth daily  baclofen (LIORESAL) 10 MG tablet, TAKE 1 TABLET BY MOUTH AT BEDTIME  cloNIDine (CATAPRES) 0.1 MG tablet, Take 0.1 mg by mouth 2 times daily  diclofenac (VOLTAREN) 1 % topical gel, Place 4 g onto the skin 4 times daily  famotidine (PEPCID) 20 MG tablet, TAKE 1 TABLET BY MOUTH ONCE DAILY  gabapentin (NEURONTIN) 100 MG capsule, Take 100 mg by mouth 3 times daily   Lidocaine (LIDOCARE) 4 % Patch, To Apply to lower back topically one time a day for pain put ON patch AND Apply to lower back topically one time a day for pain take OFF patch  melatonin 3 MG tablet, TAKE TWO TABLETS (6MG) BY MOUTH AT BEDTIME  metoprolol tartrate (LOPRESSOR) 25 MG tablet, TAKE 1 TABLET BY MOUTH TWICE DAILY  oxyCODONE (ROXICODONE) 5 MG tablet, Give 0.5 tablet by mouth every 3 hours as needed for pain 3-6 AND Give 1 tablet by mouth every 3 hours as needed for pain 7-10  polyethylene glycol (MIRALAX/GLYCOLAX) packet, Take 1 packet by mouth daily as needed for constipation  sennosides (SENOKOT) 8.6 MG tablet, Take 1 tablet by mouth 2 times daily  sertraline (ZOLOFT) 50 MG tablet, Take 50 mg by mouth daily        Review of Systems:  A Comprehensive greater than 10 system review of systems was carried out.  Pertinent positives and negatives are noted above.  Otherwise negative for contributory information.  "    Physical Exam:  Blood pressure (!) 142/78, pulse 94, temperature 99.9  F (37.7  C), temperature source Temporal, resp. rate 18, height 1.676 m (5' 6\"), weight 60.8 kg (134 lb), SpO2 95 %, not currently breastfeeding.  Wt Readings from Last 1 Encounters:   06/18/20 60.8 kg (134 lb)     Exam:  General: Awake elderly appearing woman who seems slightly confused and somnolent but able to answer some questions.  HEENT: NC/AT, eyes anicteric, external occular movements intact  Cardiac: RRR, S1, S2.  Pulmonary: Normal chest rise, normal work of breathing.  Lungs CTA BL  Abdomen: soft, non-tender, non-distended.  Bowel Sounds Present.  No guarding.  Extremities: no deformities.  Warm, well perfused.  Skin: no rashes or lesions noted.  Warm and Dry.  Neuro: Does have left-sided weakness.  No clonus or tremor.  Psych: Appropriate affect.    Data:  EKG: EKG shows sinus rhythm with no ST elevations or depressions  Results for orders placed or performed during the hospital encounter of 06/18/20   XR Chest Port 1 View    Narrative    EXAM: XR CHEST PORT 1 VW  LOCATION: [facility]  DATE/TIME: 6/18/2020 6:44 PM    INDICATION: Dyspnea  COMPARISON: None.      Impression    IMPRESSION: Suboptimal ventilatory effort with secondary compressive and congestive change. No definite infiltrate although retrocardiac area is not well seen. Degenerative disease.     Imaging:  Recent Results (from the past 48 hour(s))   XR Chest Port 1 View    Narrative    EXAM: XR CHEST PORT 1 VW  LOCATION: [facility]  DATE/TIME: 6/18/2020 6:44 PM    INDICATION: Dyspnea  COMPARISON: None.      Impression    IMPRESSION: Suboptimal ventilatory effort with secondary compressive and congestive change. No definite infiltrate although retrocardiac area is not well seen. Degenerative disease.     Labs:  Recent Labs   Lab 06/18/20  1811   WBC 18.9*   HGB 14.6   HCT 46.5   MCV 89             Lab Results   Component Value Date    NA " 139 06/18/2020     08/01/2019     06/25/2019    Lab Results   Component Value Date    CHLORIDE 107 06/18/2020    CHLORIDE 110 08/01/2019    CHLORIDE 107 06/25/2019    Lab Results   Component Value Date    BUN 23 06/18/2020    BUN 34 08/01/2019    BUN 22 06/25/2019      Lab Results   Component Value Date    POTASSIUM 3.7 06/18/2020    POTASSIUM 3.9 08/01/2019    POTASSIUM 3.7 06/25/2019    Lab Results   Component Value Date    CO2 17 06/18/2020    CO2 24 08/01/2019    CO2 27 06/25/2019    Lab Results   Component Value Date    CR 1.19 06/18/2020    CR 0.92 08/01/2019    CR 0.98 06/25/2019        Recent Labs   Lab 06/18/20 2036 06/18/20  1811   LACT 2.7* 7.2*     Recent Labs   Lab 06/18/20  1811   TROPI <0.015         Amador Vargas MD  Hospitalist  [facility]

## 2020-06-19 NOTE — CONSULTS
Care Transition Initial Assessment - SW     Met with: Spoke with daughter June via phone. Spoke with STEVE Espinoza at [facility], [phone].  Active Problems:    Loss of consciousness (H)    Syncope       DATA  Lives With: facility resident      Quality of Family Relationships: helpful, involved, supportive  Description of Support System: Supportive, Involved  Who is your support system?: Children, Facility resident(s)/Staff  Support Assessment: Adequate family and caregiver support, Adequate social supports.   Identified issues/concerns regarding health management: Pt admitted under observation for syncope. Pt had a recent UTI and has a history of stroke. Pt resides at [facility]. Spoke with STEVE Espinoza at facility to confirm. Palliative consulted for goals of care discussion. Hospice info session pending medical workup per daughter June.      Quality of Family Relationships: helpful, involved, supportive     ASSESSMENT  Cognitive Status:  Awake and alert.  Concerns to be addressed: Discharge planning.     PLAN  Patient anticipates discharging to:  Return to [facility] at discharge, likely in 1-2 days. Facility is able to take pt back over the weekend. Weekend # [phone], (F) [phone]. Transport TBD. Hospice info session pending medical workup. SW to discuss transport further with dtr June tomorrow.     REGINALDO Rodriguez   Inpatient Care Coordination  [facility]   [phone]

## 2020-06-19 NOTE — PLAN OF CARE
"PRIMARY DIAGNOSIS: \"GENERIC\" NURSING  OUTPATIENT/OBSERVATION GOALS TO BE MET BEFORE DISCHARGE:  ADLs back to baseline: Yes    Activity and level of assistance: Up with maximum assistance. Consider SW and/or PT evaluation. Up with ALFONSO and david steady, AFO brace to LLE    Pain status: Pain free.    Return to near baseline physical activity: No     Discharge Planner Nurse   Safe discharge environment identified: Yes  Barriers to discharge: Yes, awaiting further test results and PT consult       Entered by: Ivonne Beaver 06/19/2020 2:42 PM     Please review provider order for any additional goals.   Nurse to notify provider when observation goals have been met and patient is ready for discharge.  "

## 2020-06-19 NOTE — PLAN OF CARE
Pt A/O x3, afebrile. Denies pain. IV infusing. Pure wick in place, voiding in good amounts. Assist of 2. Will continue to monitor.

## 2020-06-19 NOTE — PROGRESS NOTES
[facility]  Hospitalist Progress Note  Stephen Johnston MD 06/19/20    Reason for Stay (Diagnosis): Syncope         Assessment and Plan:      Summary of Stay: Vanda Banks is a 89 year old female with history of CVA with residual left-sided deficits, mild cognitive deficits, recurrent UTIs since October 2019, and HTN who was admitted on 6/18/2020 from her care facility due to a syncopal episode.  Per report she slumped over in a chair after going up to staff and saying she could not breathe.  She could not be woken up by a sternal rub and no pulses were felt so CPR was provided for a few minutes until EMS arrived to felt a pulse.  Initial vital signs unremarkable with blood pressure 140 systolic and temperature 99  F.  Not hypoxic while here.  Confused in the ED which is improved overnight.  Lactic acid significant elevated 7.2 so she received IV fluids.  She received IV ceftriaxone given her history of UTIs, however urinalysis showed no sign of infection with < 1 urine WBC.  She did have a leukocytosis to 18.9 that improved overnight.  Her BMP was fairly unremarkable.  Troponin was undetectable.  Procalcitonin undetectable as well.  Infection seeming less likely so stopping IV ceftriaxone.  Palliative care was consulted for goals of care discussion.  Spoke with patient's daughter Dafne Paige is a cardiology PA here to discuss further work-up for his syncopal event.  She is in agreement with a TTE and a brain MRI given her history.  Also checking d-dimer in case of need to evaluate for PE.  PT consulted for generalized weakness and the syncopal episode.  After discussion with utilization review based on current assessment she is more appropriate for observation status so this is been changed and less further work-up as above demonstrates an acute process.    Problem List/Assessment and Plan:   Syncope: Episode of loss of consciousness where she slumped down in a chair after going up to staff and  reporting that she could not breathe.  She then could not be woken up with sternal rub.  Received CPR for a few minutes because her staff at care facility cannot feel a pulse.  Both cardiac and neuro etiologies in the differential.  EKG shows NSR with no ST elevation or depression.  On further review does not have convincing evidence of a severe infection going on such as UTI to explain her symptoms.  -Discussed further work-up options with Dr. Leos who agrees with trying to determine cause of syncope or at least ruling out new acute CVA with a brain MRI and obtaining an echocardiogram to make sure no significant changes.  We will also continue on telemetry, so far no arrhythmia.  Blood pressure is been 140 systolic so less likely orthostatic symptoms.  -Add on d-dimer, risk for PE with decreased mobility.  Obtaining TTE to look for any RV strain.  Depending on TTE results and d-dimer results may consider CTPA, but hold off until both results are back  -PT consult  Addendum: D-dimer slightly elevated 0.8.  TTE obtained that shows preserved EF and likely mild pulmonary HTN, however no RV dilation or dysfunction so seems less likely to be a large PE that would result in syncope.  Hold off on CTPA today, will consider if hypoxic, tachycardic, or persistent low-grade fevers of unexplained etiology.    Recent UTI: History of recurrent UTIs since October.  Apparently recently treated for enterococcus UTI that was sensitive to vancomycin, ampicillin, nitrofurantoin although not entirely clear which antibiotic she was receiving.  Urinalysis here is completely bland with <1 WBC and no other signs of infection.  Her temperature is  98-99  F.  Initial significant elevation in lactic acid improved with IV fluids.  Procalcitonin is not detectable.  Leukocytosis nearly resolved.  -Current vital signs and lab evaluation not consistent with acute infection.  Stop IV ceftriaxone for now  -Awaiting urine and blood culture  results    Lactic acidosis: Lactic acid significant elevated 7.2 with rapid improvement down to 2.7.  As above does not seem to have any sign of sepsis or significant infection at this time.  Perhaps from dehydration.  She did receive CPR so if she truly had syncope without pulses this could explain that.    Hx CVA, mild cognitive deficits: History of CVA with residual left-sided deficits.  Has some likely mild cognitive deficits at baseline.  Dysphagia screen negative.  Does not appear to be on any anti-platelet agent or anticoagulation.  Is also not on statin.  -Brain MRI as above continue syncope  -PT consult    HTN: PTA amlodipine 5 mg daily, metoprolol tartrate 25 mg twice daily, and clonidine 0.1 mg twice daily.  Blood pressure 140 systolic.  -Resume PTA regimen today with hold parameters for low blood pressure    MDD: resume pta sertraline 75mg daily.    Chronic pain: She is chronically on gabapentin 100 mg 3 times daily and baclofen at bedtime.  Resume these today.    DVT Prophylaxis: Pneumatic Compression Devices  Code Status:  DNR/DNI.  This was discussed with patient's daughter Dafne Paige on admission.  Palliative care has been consulted regarding goals of care and consideration for do not rehospitalize plan after discussion with Dafne  FEN: regular diet, stop IV fluids  Discharge Dispo: tbd.  Lives in [facility]. PT and social work consult.  Estimated Disch Date / # of Days until Disch: Pending further work-up with MRI and TTE today anticipate medically ready for discharge by tomorrow.  Contacted by utilization review who recommended change to observation status and at this time this is appropriate unless she is found to have acute CVA or significant change on echocardiogram    I spoke with the patient's daughter Dafne Paige who is a cardiology PA here regarding plan for further work-up of syncope evaluation which she is in agreement with.  She would like a call from social  "work if possible.        Interval History (Subjective):      Assumed care today.  Admitted for syncopal episode and some confusion.  Temperature 99 degree range overnight.  Now alert and answering questions appropriately.  Denies any pain, cough, shortness of breath, focal weakness.  She does report chronic dysuria when asked specifically about this.  Vital signs been stable, mildly hypertensive.                  Physical Exam:      Last Vital Signs:  BP (!) 146/81 (BP Location: Left arm)   Pulse 91   Temp 99.4  F (37.4  C) (Tympanic)   Resp 16   Ht 1.676 m (5' 6\")   Wt 65.8 kg (145 lb 1.6 oz)   SpO2 96%   Breastfeeding No   BMI 23.42 kg/m        Intake/Output Summary (Last 24 hours) at 6/19/2020 1147  Last data filed at 6/19/2020 1141  Gross per 24 hour   Intake 1034 ml   Output 1800 ml   Net -766 ml       Constitutional: Awake, NAD   Eyes: sclera white   HEENT: MMM  Respiratory:   lungs cta bilaterally, no crackles or wheeze  Cardiovascular: RRR.  No murmur   GI: non-tender, not distended, bowel sounds present  Skin: no rash   Musculoskeletal/extremities: Trace-1+ bilateral lower extremity edema  Neurologic: Alert, oriented to being in the hospital, strength equal bilaterally light touch sensation intact  Psychiatric: calm, cooperative, normal affect         Medications:      All current medications were reviewed with changes reflected in problem list.         Data:      All new lab and imaging data was reviewed.   Labs:  Recent Labs   Lab 06/18/20 2036 06/18/20  1833 06/18/20  1813   CULT Culture in progress No growth after 10 hours No growth after 10 hours     Recent Labs   Lab 06/18/20  1811      POTASSIUM 3.7   CHLORIDE 107   CO2 17*   ANIONGAP 15*   *   BUN 23   CR 1.19*   GFRESTIMATED 40*   GFRESTBLACK 47*   SHEN 8.8     Recent Labs   Lab 06/19/20  0725 06/18/20  1811   WBC 11.7* 18.9*   HGB 12.4 14.6   HCT 39.3 46.5   MCV 86 89    296     Recent Labs   Lab 06/18/20 2036   COLOR " Straw   APPEARANCE Clear   URINEGLC Negative   URINEBILI Negative   URINEKETONE Negative   SG 1.007   UBLD Negative   URINEPH 6.0   PROTEIN Negative   NITRITE Negative   LEUKEST Negative   RBCU <1   WBCU <1   Procalcitonin < 0.05  Lactic acid 7.2 down to 2.7    Imaging:   Recent Results (from the past 24 hour(s))   XR Chest Port 1 View    Narrative    EXAM: XR CHEST PORT 1 VW  LOCATION: [facility]  DATE/TIME: 6/18/2020 6:44 PM    INDICATION: Dyspnea  COMPARISON: None.      Impression    IMPRESSION: Suboptimal ventilatory effort with secondary compressive and congestive change. No definite infiltrate although retrocardiac area is not well seen. Degenerative disease.         Stephen Johnston MD

## 2020-06-19 NOTE — PLAN OF CARE
Pt admitted to room 623 at 2300. Pt inc of large urine on admit. Pt turned with assist of 3 and cleaned up. Pts adult profile completed. Yumiko started and report given to night shift nurse.

## 2020-06-19 NOTE — UTILIZATION REVIEW
"Admission Status; Secondary Review Determination     Admission Date: 6/18/2020  6:02 PM      Under the authority of the Utilization Management Committee, the utilization review process indicated a secondary review on the above patient.  The review outcome is based on review of the medical records, discussions with staff, and applying clinical experience noted on the date of the review.          (x) Observation Status Appropriate - This patient does not meet hospital inpatient criteria and is placed in observation status. If this patient's primary payer is Medicare and was admitted as an inpatient, Condition Code 44 should be used and patient status changed to \"observation\".     RATIONALE FOR DETERMINATION      89 year old female with PMH including prior CVA with residual left-sided deficits who resides in a care facility, hypertension, depression who presents with loss of consciousness.  History is limited as the patient cannot provide any history herself aside from answering questions regarding her immediate state of being.  Apparently at her assisted living in [place] she told staff that she felt she cannot breathe and fainted.  She was not responsive and apparently they could not feel pulses and actually started CPR for a few minutes.  EMS arrived and apparently she was somewhat lethargic but clearly had a pulse and was able to answer questions for EMTs.  She was diagnosed with a UTI a couple of days ago with culture results showing pansensitive Enterobacter. Vanda is actually supposed to be DNR/DNI and she would like to limit the aggressiveness of our work-up and is unsure if her mother even should be hospitalized moving forward.      Work up shows elevated lactate likely from dehydration. Lactate improved.    IV fluids started and IV Rocephin given.  Blood cultures negative and negative urinalysis.  Anticipate discharge later today.     The severity of illness, intensity of service provided, expected LOS and " risk for adverse outcome make the care appropriate for further observation; however, doesn't meet criteria for hospital inpatient admission. This was discussed with attending physician who concurred with this determination.        The information on this document is developed by the utilization review team in order for the business office to ensure compliance.  This only denotes the appropriateness of proper admission status and does not reflect the quality of care rendered.         The definitions of Inpatient Status and Observation Status used in making the determination above are those provided in the CMS Coverage Manual, Chapter 1 and Chapter 6, section 70.4.      Sincerely,     Lilly Menjivar MD  Physician Advisor  [facility]

## 2020-06-19 NOTE — PLAN OF CARE
"PRIMARY DIAGNOSIS: \"GENERIC\" NURSING  OUTPATIENT/OBSERVATION GOALS TO BE MET BEFORE DISCHARGE:  ADLs back to baseline: No    Activity and level of assistance: up with Christine vega, using AFO brace to LLE    Pain status: Pain free.    Return to near baseline physical activity: Yes     Discharge Planner Nurse   Safe discharge environment identified: Yes  Barriers to discharge: Yes, awaiting results       Entered by: Ivonne Beaver 06/19/2020 6:10 PM     Please review provider order for any additional goals.   Nurse to notify provider when observation goals have been met and patient is ready for discharge.    Pt disoriented to place, time and situation.  VSS, afebrile.  Denies pain.  Remote tele.  Up to chair with david HAMILTON, using AFO brace to LLE.  CMS intact, baseline weakness to L side from past CVA.  Tolerating regular diet.  Voiding adequately, incontinent at baseline, purewick in place.  Large BM this shift.    Pt became nauseated with turning and repositioning this afternoon while on her way to MRI, emesis x 2, IV zofran given.  Possible discharge back to [facility] tomorrow.  Will continue to monitor.  "

## 2020-06-19 NOTE — PHARMACY-ADMISSION MEDICATION HISTORY
Admission medication history interview status for this patient is complete. See [facility] admission navigator for allergy information, prior to admission medications and immunization status.     Medication history interview done via telephone during Covid-19 pandemic, indicate source(s): MAR from [facility]   Medication history resources (including written lists, pill bottles, clinic record):MAR    Changes made to PTA medication list:  Added: tussin DM, senna-s, ctums, erythromycin oin, vit D   Deleted: decadron, diclofenac, lidocaine patch and inj, melatonin, senna, oxycodone  Changed: zoloft dose, apap dose    Actions taken by pharmacist (provider contacted, etc):None     Additional medication history information:None    Medication reconciliation/reorder completed by provider prior to medication history?  Y   (Y/N)     For patients on insulin therapy: N  (Y/N)    Prior to Admission medications    Medication Sig Last Dose Taking? Auth Provider   acetaminophen (TYLENOL) 500 MG tablet Take 1,000 mg by mouth every 6 hours as needed for mild pain Past Month at Unknown time Yes Unknown, Entered By History   amLODIPine (NORVASC) 5 MG tablet Take 5 mg by mouth daily 6/18/2020 at Unknown time Yes Reported, Patient   baclofen (LIORESAL) 10 MG tablet TAKE 1 TABLET BY MOUTH AT BEDTIME Past Week at Unknown time Yes Julee Jauregui APRN CNP   cloNIDine (CATAPRES) 0.1 MG tablet Take 0.1 mg by mouth 2 times daily 6/18/2020 at Unknown time Yes Reported, Patient   erythromycin (ROMYCIN) 5 MG/GM ophthalmic ointment Place 0.5 inches Into the left eye At Bedtime 6/17/2020 at Unknown time Yes Unknown, Entered By History   famotidine (PEPCID) 20 MG tablet TAKE 1 TABLET BY MOUTH ONCE DAILY 6/18/2020 at Unknown time Yes Julee Jauregui APRN CNP   gabapentin (NEURONTIN) 100 MG capsule Take 100 mg by mouth 3 times daily  6/18/2020 at Unknown time Yes Reported, Patient   metoprolol tartrate (LOPRESSOR) 25 MG  tablet TAKE 1 TABLET BY MOUTH TWICE DAILY 6/18/2020 at Unknown time Yes Julee Jauregui, APRN CNP   polyethylene glycol (MIRALAX/GLYCOLAX) packet Take 1 packet by mouth daily as needed for constipation Past Month at Unknown time Yes Reported, Patient   senna-docusate (SENOKOT-S/PERICOLACE) 8.6-50 MG tablet Take 1 tablet by mouth 2 times daily as needed for constipation Past Month at Unknown time Yes Unknown, Entered By History   sertraline (ZOLOFT) 50 MG tablet Take 75 mg by mouth daily  6/18/2020 at Unknown time Yes Reported, Patient   Vitamin D, Cholecalciferol, 25 MCG (1000 UT) TABS Take 1 tablet by mouth daily 6/18/2020 at Unknown time Yes Unknown, Entered By History   calcium carbonate (TUMS) 500 MG chewable tablet Take 2 chew tab by mouth 4 times daily as needed for heartburn More than a month at Unknown time  Unknown, Entered By History   Dextromethorphan-guaiFENesin  MG/5ML syrup Take 5 mLs by mouth every 4 hours as needed for cough More than a month at Unknown time  Unknown, Entered By History

## 2020-06-19 NOTE — ED NOTES
"[facility]  ED Nurse Handoff Report    Vanda Banks is a 89 year old female   ED Chief complaint: Altered Mental Status  . ED Diagnosis:   Final diagnoses:   Urinary tract infection without hematuria, site unspecified   Septic shock (H)   Syncope, unspecified syncope type   Suspected COVID-19 virus infection     Allergies:   Allergies   Allergen Reactions     Azithromycin Diarrhea     Fenofibrate      Gemfibrozil      Levaquin [Levofloxacin] GI Disturbance     Penicillin G      Rosuvastatin      Vytorin      Bextra [Valdecoxib] Rash     Diltiazem Rash     Hydrochlorothiazide Rash     Sulfa Antibiotics [Sulfa Drugs] Rash     Verapamil Rash       Code Status: Full Code  Activity level - Baseline/Home:  Assist X 1. Activity Level - Current:   Total Care. Lift room needed: No. Bariatric: No   Needed: No   Isolation: No. Infection: Not Applicable  .     Vital Signs:   Vitals:    06/18/20 1945 06/18/20 2000 06/18/20 2015 06/18/20 2030   BP: (!) 149/83 (!) 144/75 138/84 (!) 146/94   Pulse: 99 96 95 92   Resp: 13 14 11 15   Temp:       TempSrc:       SpO2: 96% 96% 97% 97%   Weight:       Height:           Cardiac Rhythm:  ,   Cardiac  Cardiac Rhythm: Normal sinus rhythm  Pain level: 0-10 Pain Scale: 0  Patient confused: Yes. Patient Falls Risk: Yes.   Elimination Status: Has voided, pt is incontinent  Patient Report - Initial Complaint: Altered mental status. Pt presents vi EMS for evaluation of AMS. Pt resides at a AL facility. Walked up to staff and said \"I can't breath\" and then fainted. Per EMS, was out for about 15-20 minutes prior to their arrival. Staff started CPR for few minutes. Pt was receiving the jaw-thrust maneuver with insufficient breathing noted per EMS. Pt became verbally responsive enroute to ED and was answering questions for EMS. Hx of CVA with left sided deficits. Dx with UTI today as well. . Focused Assessment:     18:30 Respiratory Respiratory - Respiratory WDL: -WDL " except; rhythm/pattern  Rhythm/Pattern, Respiratory: shallow  Breath Sounds: All Fields   Head To Toe Assessment - All Lung Fields Breath Sounds: diminished  EO     18:30 Musculoskeletal Musculoskeletal - Musculoskeletal WDL: -WDL except; mobility  General Mobility: moderately impaired  LUE Extremity Movement: active ROM severely impaired  LLE Extremity Movement: active ROM severely impaired  Musculoskeletal Comment: hx of CVA with left sided deficits.  EO     18:30 Skin Color/Condition Skin - Skin WDL: -WDL except; color  Skin Color/Characteristics: redness blanchable  Skin Integrity: blister  Skin Comment: right heal probable blister that ahs burst. wet on bed under heels. area reddened.   EO     18:30 Neurological Cognitive - Cognitive/Neuro/Behavioral WDL: -WDL except (syncopal episode per staff. down for about 15-20 min with ineffective breathing. CPR performed with jaw-thrust maneuver for airway. became verbally resposive on way to ED. hx of CVA with left sided deficits)  [place] Coma Scale - Best Eye Response: 4-->(E4) spontaneous  Best Motor Response: 6-->(M6) obeys commands  Best Verbal Response: 4-->(V4) confused  [place] Coma Scale Score: 14   Motor Strength - Left Upper: 1 - slight contraction  Left Lower: 1 - slight contraction        18:30 Cardiac Cardiac - Cardiac WDL: WDL   Cardiac Monitoring - EKG Monitoring: Yes  Cardiac Regularity: Regular  Cardiac Rhythm: NSR  EO     18:30 Genitourinary Genitourinary - Genitourinary WDL: -WDL except; voiding ability/characteristics; general symptoms  Voiding Characteristics: frequency; incontinence; urgency  Genitourinary Comment: dx with UTI today. pt c/o frequency and urgency   Genitourinary -  Signs and Symptoms: dysuria           Tests Performed: labs, ekg, chest xray. Abnormal Results:   Labs Ordered and Resulted from Time of ED Arrival Up to the Time of Departure from the ED   CBC WITH PLATELETS DIFFERENTIAL - Abnormal; Notable for the following  components:       Result Value    WBC 18.9 (*)     RBC Count 5.25 (*)     MCHC 31.4 (*)     Absolute Neutrophil 12.3 (*)     All other components within normal limits   BASIC METABOLIC PANEL - Abnormal; Notable for the following components:    Carbon Dioxide 17 (*)     Anion Gap 15 (*)     Glucose 154 (*)     Creatinine 1.19 (*)     GFR Estimate 40 (*)     GFR Estimate If Black 47 (*)     All other components within normal limits   LACTIC ACID WHOLE BLOOD - Abnormal; Notable for the following components:    Lactic Acid 7.2 (*)     All other components within normal limits   LACTIC ACID WHOLE BLOOD - Abnormal; Notable for the following components:    Lactic Acid 2.7 (*)     All other components within normal limits   ROUTINE UA WITH MICROSCOPIC - Abnormal; Notable for the following components:    Bacteria Urine Few (*)     Mucous Urine Present (*)     All other components within normal limits   TROPONIN I   COVID-19 VIRUS (CORONAVIRUS) BY PCR   GLUCOSE MONITOR NURSING POCT   HEPATIC PANEL   CK TOTAL   SARS-COV-2 (COVID-19) VIRUS RT-PCR   BLOOD CULTURE   BLOOD CULTURE   URINE CULTURE AEROBIC BACTERIAL     XR Chest Port 1 View   Final Result   IMPRESSION: Suboptimal ventilatory effort with secondary compressive and congestive change. No definite infiltrate although retrocardiac area is not well seen. Degenerative disease.        .   Treatments provided: IV rocephin, IV bolus  Family Comments: daughter aware via phone  OBS brochure/video discussed/provided to patient:  N/A  ED Medications:   Medications   cefTRIAXone (ROCEPHIN) 1 g vial to attach to  mL bag for ADULTS or NS 50 mL bag for PEDS (0 g Intravenous Stopped 6/18/20 2012)   0.9% sodium chloride BOLUS (0 mLs Intravenous Stopped 6/18/20 2002)   0.9% sodium chloride BOLUS (1,000 mLs Intravenous New Bag 6/18/20 2002)     Drips infusing:  No  For the majority of the shift, the patient's behavior Green. Interventions performed were NA.    Sepsis treatment  initiated:   Yes    Per the ED Provider, Time Zero for severe sepsis or septic shock is:  1837    3 Hour Severe Sepsis Bundle Completion:  1. Initial Lactic Acid Result:   Recent Labs   Lab Test 06/18/20 2036 06/18/20  1811   LACT 2.7* 7.2*     2. Blood Cultures before Antibiotics: Yes  3. Broad Spectrum Antibiotics Administered:     Anti-infectives (From now, onward)    None        4. 2000 ml of IV fluids have been given so far      6 Hour Severe Sepsis Bundle Completion:    1. Repeat Lactic Acid Level:   Last result   Lab Results   Component Value Date    LACT 2.7 (H) 06/18/2020     2. Patient currently on Vasopressors =  No       ED Nurse Name/Phone Number: Breann Chand RN,   9:07 PM  RECEIVING UNIT ED HANDOFF REVIEW    Above ED Nurse Handoff Report was reviewed: Yes  Reviewed by: Roshni Benton RN on June 18, 2020 at 10:34 PM   Did you vocera the ED RN: tried and not logged in. Will have charge text ED charge.

## 2020-06-20 ENCOUNTER — APPOINTMENT (OUTPATIENT)
Dept: PHYSICAL THERAPY | Facility: CLINIC | Age: 85
End: 2020-06-20
Attending: INTERNAL MEDICINE
Payer: MEDICARE

## 2020-06-20 VITALS
HEIGHT: 66 IN | BODY MASS INDEX: 23.32 KG/M2 | HEART RATE: 96 BPM | TEMPERATURE: 97.2 F | DIASTOLIC BLOOD PRESSURE: 72 MMHG | WEIGHT: 145.1 LBS | OXYGEN SATURATION: 93 % | RESPIRATION RATE: 18 BRPM | SYSTOLIC BLOOD PRESSURE: 139 MMHG

## 2020-06-20 LAB
ANION GAP SERPL CALCULATED.3IONS-SCNC: 8 MMOL/L (ref 3–14)
BACTERIA SPEC CULT: ABNORMAL
BUN SERPL-MCNC: 12 MG/DL (ref 7–30)
CALCIUM SERPL-MCNC: 8.7 MG/DL (ref 8.5–10.1)
CHLORIDE SERPL-SCNC: 109 MMOL/L (ref 94–109)
CO2 SERPL-SCNC: 25 MMOL/L (ref 20–32)
CREAT SERPL-MCNC: 0.88 MG/DL (ref 0.52–1.04)
ERYTHROCYTE [DISTWIDTH] IN BLOOD BY AUTOMATED COUNT: 14 % (ref 10–15)
GFR SERPL CREATININE-BSD FRML MDRD: 58 ML/MIN/{1.73_M2}
GLUCOSE SERPL-MCNC: 86 MG/DL (ref 70–99)
HCT VFR BLD AUTO: 39.5 % (ref 35–47)
HGB BLD-MCNC: 12.3 G/DL (ref 11.7–15.7)
Lab: ABNORMAL
MCH RBC QN AUTO: 27.2 PG (ref 26.5–33)
MCHC RBC AUTO-ENTMCNC: 31.1 G/DL (ref 31.5–36.5)
MCV RBC AUTO: 87 FL (ref 78–100)
PLATELET # BLD AUTO: 198 10E9/L (ref 150–450)
POTASSIUM SERPL-SCNC: 3.6 MMOL/L (ref 3.4–5.3)
RBC # BLD AUTO: 4.52 10E12/L (ref 3.8–5.2)
SODIUM SERPL-SCNC: 142 MMOL/L (ref 133–144)
SPECIMEN SOURCE: ABNORMAL
WBC # BLD AUTO: 10.3 10E9/L (ref 4–11)

## 2020-06-20 PROCEDURE — 85027 COMPLETE CBC AUTOMATED: CPT | Performed by: INTERNAL MEDICINE

## 2020-06-20 PROCEDURE — 97161 PT EVAL LOW COMPLEX 20 MIN: CPT | Mod: GP | Performed by: PHYSICAL THERAPIST

## 2020-06-20 PROCEDURE — G0378 HOSPITAL OBSERVATION PER HR: HCPCS

## 2020-06-20 PROCEDURE — 36415 COLL VENOUS BLD VENIPUNCTURE: CPT | Performed by: INTERNAL MEDICINE

## 2020-06-20 PROCEDURE — 99217 ZZC OBSERVATION CARE DISCHARGE: CPT | Performed by: INTERNAL MEDICINE

## 2020-06-20 PROCEDURE — 80048 BASIC METABOLIC PNL TOTAL CA: CPT | Performed by: INTERNAL MEDICINE

## 2020-06-20 PROCEDURE — 25000132 ZZH RX MED GY IP 250 OP 250 PS 637: Mod: GY | Performed by: INTERNAL MEDICINE

## 2020-06-20 RX ORDER — AMLODIPINE BESYLATE 5 MG/1
5 TABLET ORAL DAILY
Start: 2020-06-20

## 2020-06-20 RX ORDER — METOPROLOL TARTRATE 25 MG/1
25 TABLET, FILM COATED ORAL 2 TIMES DAILY
Qty: 62 TABLET | Refills: 98
Start: 2020-06-20

## 2020-06-20 RX ORDER — CLONIDINE HYDROCHLORIDE 0.1 MG/1
0.1 TABLET ORAL 2 TIMES DAILY
Start: 2020-06-20

## 2020-06-20 RX ADMIN — METOPROLOL TARTRATE 25 MG: 25 TABLET, FILM COATED ORAL at 08:39

## 2020-06-20 RX ADMIN — GABAPENTIN 100 MG: 100 CAPSULE ORAL at 08:39

## 2020-06-20 RX ADMIN — FAMOTIDINE 20 MG: 20 TABLET, FILM COATED ORAL at 08:39

## 2020-06-20 RX ADMIN — AMLODIPINE BESYLATE 5 MG: 5 TABLET ORAL at 08:39

## 2020-06-20 RX ADMIN — SERTRALINE HYDROCHLORIDE 75 MG: 50 TABLET ORAL at 08:39

## 2020-06-20 RX ADMIN — GABAPENTIN 100 MG: 100 CAPSULE ORAL at 14:09

## 2020-06-20 RX ADMIN — CLONIDINE HYDROCHLORIDE 0.1 MG: 0.1 TABLET ORAL at 08:39

## 2020-06-20 NOTE — PLAN OF CARE
"PRIMARY DIAGNOSIS: \"GENERIC\" NURSING  OUTPATIENT/OBSERVATION GOALS TO BE MET BEFORE DISCHARGE:  1. ADLs back to baseline: No    2. Activity and level of assistance: Up with Ax2 and sarasteady with AFO brace.     3. Pain status: Pain free.    4. Return to near baseline physical activity: No     Discharge Planner Nurse   Safe discharge environment identified: Yes  Barriers to discharge: No       Entered by: Arti Kahn 06/20/2020 5:04 AM     Please review provider order for any additional goals.   Nurse to notify provider when observation goals have been met and patient is ready for discharge.    Pt is oriented to self and time- can be forgetful at times. VSS on RA. LS clear. Incontinent of urine- purewick in place. +BS. Denied pain. Denied nausea, tolerating regular diet. SL. Remote tele monitoring. Up with Ax2 and sarasteady. Plan is possible discharge back to [facility] today. Will continue plan of care.   "

## 2020-06-20 NOTE — PROGRESS NOTES
Transport scheduled for 12:30 pm today with [facility].  Ortho notified.  Message left for patient's daughter Dafne ([phone])  to call [facility] to discuss hospice and discharge.       [facility] received TC from MD stating daughter Dafne would like to transport and [facility] states they will be able to assist with patient when she arrives by car.  Dafne also requested PT evaluation prior to discharge and MD states she will be ordering PT/OT once patient is discharged.   Will discuss HC choices when Dafne returns message.

## 2020-06-20 NOTE — PLAN OF CARE
"PRIMARY DIAGNOSIS: \"GENERIC\" NURSING  OUTPATIENT/OBSERVATION GOALS TO BE MET BEFORE DISCHARGE:  ADLs back to baseline: No    Activity and level of assistance: up with A2 and david vega, AFO brace to LLE.  PT consult pending    Pain status: Pain free.    Return to near baseline physical activity: No     Discharge Planner Nurse   Safe discharge environment identified: Yes  Barriers to discharge: Yes, needs to work with PT       Entered by: Ivonne Beaver 06/20/2020 11:05 AM     Please review provider order for any additional goals.   Nurse to notify provider when observation goals have been met and patient is ready for discharge.  "

## 2020-06-20 NOTE — PLAN OF CARE
PT: Orders received. Evaluation completed. Pt resides in a care suite. Unable to report what they currently assist with, but the pt does report spending a majority of her day sitting and watching TV. Pt owns a walker, but wasn't clear if she has been able to ambulate with it recently or not.     Discharge Planner PT   Patient plan for discharge: Return to care suite  Current status: Pt supine upon initiation, agreeable to session. Pt completes sit<>supine with modA. Pt unable to safely stand and/or pivot with Ax1 at this time. Pt returns to supine with modA. Requires Sangeeta to reposition in supine. Pt supine with all needs in reach at end of session.   Barriers to return to prior living situation: None anticipated  Recommendations for discharge: Return to care suits via medical transport  Rationale for recommendations: Care suites can provide level of care required at discharge. Requires medical transport as pt is unable to safely complete transfers with a skilled Ax1 at this time. It is not safe/appropriate for family to provide transport at this time.        Entered by: Ariana Bridges 06/20/2020 11:56 AM

## 2020-06-20 NOTE — DISCHARGE SUMMARY
[facility]  Discharge Summary  Name: Vanda Banks    MRN: [id number]  YOB: 1930    Age: 89 year old  Date of Discharge:  6/20/2020  Date of Admission: 6/18/2020  Primary Care Provider: Blanka King  Discharge Physician:  Cookie Echeverria MD  Discharging Service:  Hospitalist      Discharge Diagnoses:  1. Syncope  2. Recent UTI  3. Lactic Acidosis  4. History of CVA with mild cognitive deficits  5. HTN  6. MDD  7. Chronic Pain     Follow-ups Needed After Discharge   New POLST filled out stating DNR/DNI and do not re hospitalize    Unresulted Labs Ordered in the Past 30 Days of this Admission     No orders found from 10/16/2018 to 12/16/2018.        Hospital Course:  Vanda Banks is a 89 year old female with history of CVA with residual left-sided deficits, mild cognitive deficits, recurrent UTIs since October 2019, and HTN who was admitted on 6/18/2020 from her care facility due to a syncopal episode.      Per report she slumped over in a chair after going up to staff and saying she could not breathe.  She could not be woken up by a sternal rub and no pulses were felt so CPR was provided for a few minutes until EMS arrived at which time patient was noted to have a pulse.  Initial vital signs unremarkable with blood pressure 140 systolic and temperature 99  F.  Not hypoxic while here.  Confused in the ED which is improved overnight.  Lactic acid significant elevated 7.2 so she received IV fluids.  She received IV ceftriaxone given her history of UTIs, however urinalysis showed no sign of infection with < 1 urine WBC.  She did have a leukocytosis to 18.9 that improved overnight.  Her BMP was fairly unremarkable.  Troponin was undetectable.  Procalcitonin undetectable as well.  Infection seeming less likely so antibiotics discontinued.  Palliative care was consulted for goals of care discussion.  TTE obtained which showed no significant pathology, MRI negative for CVA.  Will discharge back to  nursing home with PT/OT.     Syncope: Episode of loss of consciousness where she slumped down in a chair after going up to staff and reporting that she could not breathe.  She then could not be woken up with sternal rub.  Received CPR for a few minutes because her staff at care facility could not feel a pulse.  Both cardiac and neuro etiologies in the differential.  EKG shows NSR with no ST elevation or depression.  On further review does not have convincing evidence of a severe infection going on such as UTI to explain her symptoms. TTE obtained which showed normal EF and not significant valvular abnormalities.  MRI negative for acute CVA.  D-dimer 0.8 but age adjusted this is low risk for VTE. No further recurrence of symptoms while hospitalized.     Recent UTI: History of recurrent UTIs since October.  Apparently recently treated for enterococcus UTI that was sensitive to vancomycin, ampicillin, nitrofurantoin although not entirely clear which antibiotic she was receiving.  Urinalysis here is completely bland with <1 WBC and no other signs of infection.  Her temperature is  98-99  F.  Initial significant elevation in lactic acid improved with IV fluids.  Procalcitonin is not detectable.  Leukocytosis nearly resolved.  Antibiotics stopped and she will not require antibiotics upon discharge.     Lactic acidosis: Lactic acid significant elevated 7.2 with rapid improvement down to 2.7.  As above does not seem to have any sign of sepsis or significant infection at this time.  Perhaps from dehydration.  She did receive CPR so if she truly had syncope without pulses this could explain that.     Hx CVA, mild cognitive deficits: History of CVA with residual left-sided deficits.  Has some likely mild cognitive deficits at baseline.  Dysphagia screen negative.  Does not appear to be on any anti-platelet agent or anticoagulation.  Is also not on statin.  MRI showed no acute CVA.  Patient's daughter, Dafne Paige, reports that prior to  "COVID-19 she would ambulate with her with a gait belt.  She has not been able to see her for 3 months.  At this time patient is using a [employer] steady for transfers.  PT assessed here, will discharge with PT/OT.       HTN: PTA amlodipine 5 mg daily, metoprolol tartrate 25 mg twice daily, and clonidine 0.1 mg twice daily. Medications continued with hold parameters.    MDD: resume pta sertraline 75 mg daily.     Chronic pain: She is chronically on gabapentin 100 mg 3 times daily and baclofen at bedtime. Continued.     Discharge Disposition:  Discharged to assisted living     Allergies:  Allergies   Allergen Reactions     Azithromycin Diarrhea     Fenofibrate      Gemfibrozil      Levaquin [Levofloxacin] GI Disturbance     Penicillin G      Rosuvastatin      Vytorin      Bextra [Valdecoxib] Rash     Diltiazem Rash     Hydrochlorothiazide Rash     Sulfa Antibiotics [Sulfa Drugs] Rash     Verapamil Rash        Condition on Discharge:  Discharge condition: Stable   Discharge vitals: Blood pressure 139/72, pulse 96, temperature 97.2  F (36.2  C), temperature source Temporal, resp. rate 18, height 1.676 m (5' 6\"), weight 65.8 kg (145 lb 1.6 oz), SpO2 93 %, not currently breastfeeding.   Code status on discharge: DNR / DNI     History of Illness:  See detailed admission note for full details.    Physical Exam:  Blood pressure 139/72, pulse 96, temperature 97.2  F (36.2  C), temperature source Temporal, resp. rate 18, height 1.676 m (5' 6\"), weight 65.8 kg (145 lb 1.6 oz), SpO2 93 %, not currently breastfeeding.  Wt Readings from Last 1 Encounters:   06/18/20 65.8 kg (145 lb 1.6 oz)     General: Alert, awake, no acute distress. Sitting up in a chair eating breakfast  HEENT: Normocephalic, atraumatic, eyes anicteric and without scleral injection, EOMI, MMM.  Cardiac: RRR, normal S1, S2.  No m/g/r. No LE edema.  Pulmonary: Normal chest rise, normal work of breathing.  Lungs CTAB without crackles or wheezing  Abdomen: soft, " non-tender, non-distended.  Normoactive BS.  No guarding or rebound tenderness.  Extremities: no deformities.  Warm, well perfused.  Skin: no rashes or lesions noted.  Warm and Dry.  Neuro: Weakness diffusely but more so on the left side.  Speech clear.  Able to feed herself.  Cannot get out of the chair without significant assistance.  Psych: Appropriate affect. Alert to self, place but not exact situation    Procedures other than Imaging:  Telemetry     Imaging:  Results for orders placed or performed during the hospital encounter of 06/18/20   XR Chest Port 1 View    Narrative    EXAM: XR CHEST PORT 1 VW  LOCATION: [facility]  DATE/TIME: 6/18/2020 6:44 PM    INDICATION: Dyspnea  COMPARISON: None.      Impression    IMPRESSION: Suboptimal ventilatory effort with secondary compressive and congestive change. No definite infiltrate although retrocardiac area is not well seen. Degenerative disease.   MR Brain w/o Contrast    Narrative    MRI BRAIN WITHOUT CONTRAST  6/19/2020 5:32 PM    HISTORY:  Altered level of consciousness (LOC), unexplained; syncope,  confusion, rule out acute CVA    TECHNIQUE:  Multiplanar, multisequence MRI of the brain without  gadolinium IV contrast material.    COMPARISON:  None.    FINDINGS:  Postoperative changes seen in the right posterior  frontal/anterior parietal region. A postoperative cavity is seen  beneath the craniotomy defect. Wallerian degeneration is seen in the  right cortical spinal tract. There is no evidence of hemorrhage, mass,  acute infarct, or anomaly.  There is T2 hyperintense material in the  left mastoid compatible with fluid or inflammatory debris. This is  usually an incidental finding. The arteries at the base of the brain  and the dural venous sinuses appear patent.      Impression    IMPRESSION:    1. No acute pathology. No bleed, mass, or acute infarcts.  2. Postoperative change in the right posterior frontal/parietal  region. There is a  postoperative cavity in this area. Associated  wallerian degeneration is noted in the right cortical spinal tract.   3. There is diffuse parenchymal volume loss.  White matter changes are  present in the cerebral hemispheres that are consistent with small  vessel ischemic disease in this age patient.    SLAVA DAWN MD   Echocardiogram Complete    Narrative    [id number]  [id number]  [id number]  [id number]^MARK^LEANNE^[facility]  Echocardiography Laboratory  [address] [place] [address]        Name: CRISTY HUNTER  MRN: [id number]  : 10/14/1930  Study Date: 2020 01:18 PM  Age: 89 yrs  Gender: Female  Patient Location: [facility]  Reason For Study: Syncope  Ordering Physician: LEANNE FLORES  Referring Physician: Blanka King  Performed By: Jessica Paige RDCS     BSA: 1.7 m2  Height: 66 in  Weight: 145 lb  HR: 104  BP: 146/81 mmHg  _____________________________________________________________________________  __        Procedure  Complete Portable Echo Adult.  _____________________________________________________________________________  __        Interpretation Summary     A cardiac structural cause for syncope was not identified.  Sinus rhythm was noted.  Hyperdynamic left ventricular function  The visual ejection fraction is estimated at >70%.  The left ventricle is normal in size.  There is mild concentric left ventricular hypertrophy.  The right ventricle is normal in structure, function and size.  Right ventricular systolic pressure is elevated, consistent with mild  pulmonary hypertension.  Doppler interrogation does not demonstrate significant stenosis or  insufficiency involving cardiac valves     No old studies available for comparison.  _____________________________________________________________________________  __        Left Ventricle  The left ventricle is normal in size. There is mild concentric left  ventricular hypertrophy. Hyperdynamic left ventricular  function. The visual  ejection fraction is estimated at >70%. Grade I or early diastolic  dysfunction. No regional wall motion abnormalities noted. There is no thrombus  seen in the left ventricle.     Right Ventricle  The right ventricle is normal in structure, function and size. There is no  mass or thrombus in the right ventricle.     Atria  Normal left atrial size. Right atrial size is normal. There is no atrial shunt  seen. The left atrial appendage is not well visualized.     Mitral Valve  The mitral valve leaflets appear normal. There is no evidence of stenosis,  fluttering, or prolapse. There is no mitral regurgitation noted. There is no  mitral valve stenosis.        Tricuspid Valve  Normal tricuspid valve. The right ventricular systolic pressure is elevated at  32.9 mmHg. Right ventricular systolic pressure is elevated, consistent with  mild pulmonary hypertension. There is no tricuspid stenosis.     Aortic Valve  The aortic valve is trileaflet. No aortic regurgitation is present. No aortic  stenosis is present.     Pulmonic Valve  Normal pulmonic valve. There is no pulmonic valvular regurgitation. There is  no pulmonic valvular stenosis.     Vessels  The aortic root is normal size. Normal size ascending aorta. The inferior vena  cava is normal. The pulmonary artery is normal size.     Pericardium  The pericardium appears normal. There is no pleural effusion.        Rhythm  Sinus rhythm was noted.  _____________________________________________________________________________  __  MMode/2D Measurements & Calculations  IVSd: 1.4 cm     LVIDd: 3.3 cm  LVIDs: 2.2 cm  LVPWd: 0.90 cm  FS: 35.3 %  LV mass(C)d: 115.3 grams  LV mass(C)dI: 66.1 grams/m2  Ao root diam: 3.6 cm  LA dimension: 2.7 cm  asc Aorta Diam: 3.5 cm  LA/Ao: 0.76  LA Volume (BP): 33.6 ml  LA Volume Index (BP): 19.3 ml/m2  RWT: 0.54           Doppler Measurements & Calculations  MV E max constantin: 86.8 cm/sec  MV A max constantin: 105.7 cm/sec  MV E/A:  0.82  MV max P.2 mmHg  MV mean PG: 3.2 mmHg  MV V2 VTI: 19.8 cm  MV P1/2t max constantin: 94.7 cm/sec  MV P1/2t: 90.2 msec  MVA(P1/2t): 2.4 cm2  MV dec slope: 307.6 cm/sec2  MV dec time: 0.24 sec  LV V1 max P.3 mmHg  LV V1 max: 125.1 cm/sec  LV V1 VTI: 23.8 cm  PA acc time: 0.08 sec  TR max constantin: 287.0 cm/sec  TR max P.9 mmHg  E/E' av.4  Lateral E/e': 16.5  Medial E/e': 12.3              _____________________________________________________________________________  __        Report approved by: Dr. Luis Alberto Tate 2020 02:50 PM             Consultations:  Consultations This Hospital Stay   SOCIAL WORK IP CONSULT  PALLIATIVE CARE ADULT IP CONSULT  PHYSICAL THERAPY ADULT IP CONSULT     Recent Lab Results:  Recent Labs   Lab 2025 20  1811   WBC 10.3 11.7* 18.9*   HGB 12.3 12.4 14.6   HCT 39.5 39.3 46.5   MCV 87 86 89    206 296     Recent Labs   Lab 20  1811    139   POTASSIUM 3.6 3.7   CHLORIDE 109 107   CO2 25 17*   ANIONGAP 8 15*   GLC 86 154*   BUN 12 23   CR 0.88 1.19*   GFRESTIMATED 58* 40*   GFRESTBLACK 67 47*   SHEN 8.7 8.8     Recent Labs   Lab 20  1811   LACT 2.7* 7.2*     Recent Labs   Lab 20  181   TROPI <0.015          Pending Results:    Unresulted Labs Ordered in the Past 30 Days of this Admission     Date and Time Order Name Status Description    2020 Blood culture Preliminary     2020 1812 Blood culture Preliminary            Discharge Instructions and Follow-Up:   Discharge Orders      Home Care PT Referral for Hospital Discharge      Home Care OT Referral for Hospital Discharge      Reason for your hospital stay    You were hospitalized for a syncopal episode.  You had a TTE which showed no significant changes as well as an MRI which showed no acute stroke. You did not have recurrence of your symptoms while hospitalized.     Follow-up and recommended labs and tests     Hospice  informational meeting if family interested can take place at [facility].     Activity    Your activity upon discharge: up with assistance     MD face to face encounter    Documentation of Face to Face and Certification for Home Health Services    I certify that patient: Vanda Banks is under my care and that I, or a nurse practitioner or physician's assistant working with me, had a face-to-face encounter that meets the physician face-to-face encounter requirements with this patient on: 6/20/2020.    This encounter with the patient was in whole, or in part, for the following medical condition, which is the primary reason for home health care: CVA, weakness, syncope.    I certify that, based on my findings, the following services are medically necessary home health services: Occupational Therapy and Physical Therapy.    My clinical findings support the need for the above services because: Occupational Therapy Services are needed to assess and treat cognitive ability and address ADL safety due to impairment in ADLs, weakness. and Physical Therapy Services are needed to assess and treat the following functional impairments: mobility, weakness.    Further, I certify that my clinical findings support that this patient is homebound (i.e. absences from home require considerable and taxing effort and are for medical reasons or [facility] services or infrequently or of short duration when for other reasons) because: Requires assistance of another person or specialized equipment to access medical services because patient: Is unable to operate assistive equipment on their own...    Based on the above findings. I certify that this patient is confined to the home and needs intermittent skilled nursing care, physical therapy and/or speech therapy.  The patient is under my care, and I have initiated the establishment of the plan of care.  This patient will be followed by a physician who will periodically review the plan of  care.  Physician/Provider to provide follow up care: Blanka King    Attending hospital physician (the Medicare certified [facility] provider): Cookie Echeverria MD  Physician Signature: See electronic signature associated with these discharge orders.  Date: 6/20/2020     DNR/DNI     Diet    Follow this diet upon discharge: Orders Placed This Encounter      Combination Diet Regular Diet Adult     Discharge Medications   Current Discharge Medication List      CONTINUE these medications which have CHANGED    Details   amLODIPine (NORVASC) 5 MG tablet Take 1 tablet (5 mg) by mouth daily Hold for SBP < 115  Qty:      Associated Diagnoses: Essential hypertension      cloNIDine (CATAPRES) 0.1 MG tablet Take 1 tablet (0.1 mg) by mouth 2 times daily Hold for SBP < 110 or HR < 55  Qty:      Associated Diagnoses: Essential hypertension      metoprolol tartrate (LOPRESSOR) 25 MG tablet Take 1 tablet (25 mg) by mouth 2 times daily Hold for SBP < 100 or HR < 55  Qty: 62 tablet, Refills: 98    Associated Diagnoses: Hypertension, unspecified type         CONTINUE these medications which have NOT CHANGED    Details   acetaminophen (TYLENOL) 500 MG tablet Take 1,000 mg by mouth every 6 hours as needed for mild pain      baclofen (LIORESAL) 10 MG tablet TAKE 1 TABLET BY MOUTH AT BEDTIME  Qty: 28 tablet, Refills: 98    Associated Diagnoses: Muscle spasm      erythromycin (ROMYCIN) 5 MG/GM ophthalmic ointment Place 0.5 inches Into the left eye At Bedtime      famotidine (PEPCID) 20 MG tablet TAKE 1 TABLET BY MOUTH ONCE DAILY  Qty: 31 tablet, Refills: 98    Associated Diagnoses: Gastroesophageal reflux disease, esophagitis presence not specified      gabapentin (NEURONTIN) 100 MG capsule Take 100 mg by mouth 3 times daily       polyethylene glycol (MIRALAX/GLYCOLAX) packet Take 1 packet by mouth daily as needed for constipation      senna-docusate (SENOKOT-S/PERICOLACE) 8.6-50 MG tablet Take 1 tablet by mouth 2 times daily as needed for  constipation      sertraline (ZOLOFT) 50 MG tablet Take 75 mg by mouth daily       Vitamin D, Cholecalciferol, 25 MCG (1000 UT) TABS Take 1 tablet by mouth daily      calcium carbonate (TUMS) 500 MG chewable tablet Take 2 chew tab by mouth 4 times daily as needed for heartburn      Dextromethorphan-guaiFENesin  MG/5ML syrup Take 5 mLs by mouth every 4 hours as needed for cough             Time Spent on this Encounter   I, Cookie Echeverria MD, personally saw the patient today and spent greater than 30 minutes discharging this patient.    Cookie Echeverria MD

## 2020-06-20 NOTE — PLAN OF CARE
Pt oriented to self only.  VSS, afebrile.  Denies pain.  Remote tele monitoring.  Up with A2, david steady and AFO brace to LLE, up to chair for breakfast.  Tolerating regular diet.  Voiding adequately, incontinent at baseline, purewick in place.  Pt picked up and transferred back to [facility] via  WC at 1430, sent with copy of discharge instructions.

## 2020-06-20 NOTE — PROGRESS NOTES
06/20/20 1159   Quick Adds   Type of Visit Initial PT Evaluation   Living Environment   Lives With facility resident   Living Arrangements assisted living   Home Accessibility no concerns   Self-Care   Usual Activity Tolerance fair   Current Activity Tolerance poor   Regular Exercise No   Equipment Currently Used at Home walker, rolling;wheelchair, manual;lift device   Activity/Exercise/Self-Care Comment AFO on L LE   Functional Level Prior   Ambulation 3-->assistive equipment and person   Transferring 3-->assistive equipment and person   Toileting 3-->assistive equipment and person   Bathing 3-->assistive equipment and person   Fall history within last six months no   Which of the above functional risks had a recent onset or change? ambulation;transferring;toileting;bathing;dressing   General Information   Onset of Illness/Injury or Date of Surgery - Date 06/19/20   Referring Physician Stephen Johnston MD    Patient/Family Goals Statement Discharge home   Pertinent History of Current Problem (include personal factors and/or comorbidities that impact the POC) Vanda Banks is a 89 year old female with history of CVA with residual left-sided deficits, mild cognitive deficits, recurrent UTIs since October 2019, and HTN who was admitted on 6/18/2020 from her care facility due to a syncopal episode   Precautions/Limitations fall precautions   Weight-Bearing Status - LLE full weight-bearing   Weight-Bearing Status - RLE full weight-bearing   General Observations Pt supine upon initiation, agreeable to session.    Cognitive Status Examination   Orientation person   Level of Consciousness alert   Follows Commands and Answers Questions 75% of the time   Personal Safety and Judgment impaired   Memory impaired   Pain Assessment   Patient Currently in Pain No   Integumentary/Edema   Integumentary/Edema no deficits were identifed   Posture    Posture Forward head position;Protracted shoulders   Range of Motion (ROM)  "  ROM Comment WFL   Strength   Strength Comments Decreased L side strength due to CVA at baseline   Bed Mobility   Bed Mobility Comments sit<>supine modA   Transfer Skills   Transfer Comments sit<>stand attempted with mod-maxA   Gait   Gait Comments Non ambulatory at this time   Balance   Balance Comments Requires B UE support for safe dynamic mobility   Sensory Examination   Sensory Perception no deficits were identified   Coordination   Coordination no deficits were identified   Muscle Tone   Muscle Tone no deficits were identified   Clinical Impression   Criteria for Skilled Therapeutic Intervention evaluation only   PT Diagnosis Impaired functional mobiltiy   Influenced by the following impairments L sided weakness, deconditioning   Functional limitations due to impairments Difficulty with bed mobility, transfers, ambulation   Clinical Presentation Stable/Uncomplicated   Clinical Presentation Rationale medically stable, ready for DC   Clinical Decision Making (Complexity) Low complexity   Predicted Duration of Therapy Intervention (days/wks) eval only   Anticipated Discharge Disposition Home with Assist   Risk & Benefits of therapy have been explained Yes   Patient, Family & other staff in agreement with plan of care Yes   [facility] TM \"6 Clicks\"   2016, Trustees of [facility], under license to [employer].  All rights reserved.   6 Clicks Short Forms Basic Mobility Inpatient Short Form   [facility] AM-PAC  \"6 Clicks\" V.2 Basic Mobility Inpatient Short Form   1. Turning from your back to your side while in a flat bed without using bedrails? 3 - A Little   2. Moving from lying on your back to sitting on the side of a flat bed without using bedrails? 2 - A Lot   3. Moving to and from a bed to a chair (including a wheelchair)? 2 - A Lot   4. Standing up from a chair using your arms (e.g., wheelchair, or bedside chair)? 2 - A Lot   5. To walk in hospital room? 1 - Total   6. Climbing " 3-5 steps with a railing? 1 - Total   Basic Mobility Raw Score (Score out of 24.Lower scores equate to lower levels of function) 11   Total Evaluation Time   Total Evaluation Time (Minutes) 15

## 2020-06-22 LAB — GLUCOSE BLDC GLUCOMTR-MCNC: 157 MG/DL (ref 70–99)

## 2020-06-24 LAB
BACTERIA SPEC CULT: NO GROWTH
BACTERIA SPEC CULT: NO GROWTH
SPECIMEN SOURCE: NORMAL
SPECIMEN SOURCE: NORMAL

## 2020-10-16 ENCOUNTER — RECORDS - HEALTHEAST (OUTPATIENT)
Dept: LAB | Facility: CLINIC | Age: 85
End: 2020-10-16

## 2020-10-16 LAB
ALBUMIN UR-MCNC: NEGATIVE MG/DL
AMORPH CRY #/AREA URNS HPF: ABNORMAL /[HPF]
APPEARANCE UR: ABNORMAL
BACTERIA #/AREA URNS HPF: ABNORMAL HPF
BILIRUB UR QL STRIP: NEGATIVE
COLOR UR AUTO: YELLOW
GLUCOSE UR STRIP-MCNC: NEGATIVE MG/DL
HGB UR QL STRIP: NEGATIVE
KETONES UR STRIP-MCNC: NEGATIVE MG/DL
LEUKOCYTE ESTERASE UR QL STRIP: ABNORMAL
MUCOUS THREADS #/AREA URNS LPF: ABNORMAL LPF
NITRATE UR QL: POSITIVE
PH UR STRIP: 5.5 [PH] (ref 4.5–8)
RBC #/AREA URNS AUTO: ABNORMAL HPF
SP GR UR STRIP: 1.01 (ref 1–1.03)
SQUAMOUS #/AREA URNS AUTO: ABNORMAL LPF
UROBILINOGEN UR STRIP-ACNC: ABNORMAL
WBC #/AREA URNS AUTO: ABNORMAL HPF

## 2020-10-18 LAB — BACTERIA SPEC CULT: ABNORMAL

## 2021-03-19 ENCOUNTER — RECORDS - HEALTHEAST (OUTPATIENT)
Dept: LAB | Facility: CLINIC | Age: 86
End: 2021-03-19

## 2021-03-19 LAB
SARS-COV-2 PCR COMMENT: NORMAL
SARS-COV-2 RNA SPEC QL NAA+PROBE: NEGATIVE
SARS-COV-2 VIRUS SPECIMEN SOURCE: NORMAL

## 2021-03-26 ENCOUNTER — RECORDS - HEALTHEAST (OUTPATIENT)
Dept: LAB | Facility: CLINIC | Age: 86
End: 2021-03-26

## 2021-04-09 ENCOUNTER — RECORDS - HEALTHEAST (OUTPATIENT)
Dept: LAB | Facility: CLINIC | Age: 86
End: 2021-04-09

## 2022-02-17 PROBLEM — K21.9 GASTROESOPHAGEAL REFLUX DISEASE: Status: ACTIVE | Noted: 2019-06-18

## 2022-02-25 ENCOUNTER — LAB REQUISITION (OUTPATIENT)
Dept: LAB | Facility: CLINIC | Age: 87
End: 2022-02-25
Payer: MEDICARE

## 2022-02-25 DIAGNOSIS — I10 ESSENTIAL (PRIMARY) HYPERTENSION: ICD-10-CM

## 2022-02-25 DIAGNOSIS — F03.90 UNSPECIFIED DEMENTIA WITHOUT BEHAVIORAL DISTURBANCE: ICD-10-CM

## 2022-02-25 LAB
ALBUMIN SERPL-MCNC: 3.5 G/DL (ref 3.5–5)
ALP SERPL-CCNC: 83 U/L (ref 45–120)
ALT SERPL W P-5'-P-CCNC: 11 U/L (ref 0–45)
ANION GAP SERPL CALCULATED.3IONS-SCNC: 9 MMOL/L (ref 5–18)
AST SERPL W P-5'-P-CCNC: 19 U/L (ref 0–40)
BILIRUB DIRECT SERPL-MCNC: 0.2 MG/DL
BILIRUB SERPL-MCNC: 0.4 MG/DL (ref 0–1)
BUN SERPL-MCNC: 30 MG/DL (ref 8–28)
CALCIUM SERPL-MCNC: 9.5 MG/DL (ref 8.5–10.5)
CHLORIDE BLD-SCNC: 110 MMOL/L (ref 98–107)
CO2 SERPL-SCNC: 22 MMOL/L (ref 22–31)
CREAT SERPL-MCNC: 0.96 MG/DL (ref 0.6–1.1)
ERYTHROCYTE [DISTWIDTH] IN BLOOD BY AUTOMATED COUNT: 14 % (ref 10–15)
GFR SERPL CREATININE-BSD FRML MDRD: 56 ML/MIN/1.73M2
GLUCOSE BLD-MCNC: 122 MG/DL (ref 70–125)
HCT VFR BLD AUTO: 39.6 % (ref 35–47)
HGB BLD-MCNC: 12.7 G/DL (ref 11.7–15.7)
MCH RBC QN AUTO: 28.2 PG (ref 26.5–33)
MCHC RBC AUTO-ENTMCNC: 32.1 G/DL (ref 31.5–36.5)
MCV RBC AUTO: 88 FL (ref 78–100)
PLATELET # BLD AUTO: 287 10E3/UL (ref 150–450)
POTASSIUM BLD-SCNC: 4 MMOL/L (ref 3.5–5)
PROT SERPL-MCNC: 6.5 G/DL (ref 6–8)
RBC # BLD AUTO: 4.51 10E6/UL (ref 3.8–5.2)
SODIUM SERPL-SCNC: 141 MMOL/L (ref 136–145)
WBC # BLD AUTO: 11.1 10E3/UL (ref 4–11)

## 2022-02-25 PROCEDURE — P9603 ONE-WAY ALLOW PRORATED MILES: HCPCS | Mod: ORL | Performed by: INTERNAL MEDICINE

## 2022-02-25 PROCEDURE — 85027 COMPLETE CBC AUTOMATED: CPT | Mod: ORL | Performed by: INTERNAL MEDICINE

## 2022-02-25 PROCEDURE — 36415 COLL VENOUS BLD VENIPUNCTURE: CPT | Mod: ORL | Performed by: INTERNAL MEDICINE

## 2022-02-25 PROCEDURE — 80053 COMPREHEN METABOLIC PANEL: CPT | Mod: ORL | Performed by: INTERNAL MEDICINE

## 2022-02-25 PROCEDURE — 82248 BILIRUBIN DIRECT: CPT | Mod: ORL | Performed by: INTERNAL MEDICINE

## 2022-02-25 PROCEDURE — 82306 VITAMIN D 25 HYDROXY: CPT | Mod: ORL | Performed by: INTERNAL MEDICINE

## 2022-02-28 LAB — DEPRECATED CALCIDIOL+CALCIFEROL SERPL-MC: 54 UG/L (ref 30–80)

## 2022-05-10 ENCOUNTER — LAB REQUISITION (OUTPATIENT)
Dept: LAB | Facility: CLINIC | Age: 87
End: 2022-05-10
Payer: MEDICARE

## 2022-05-10 DIAGNOSIS — U07.1 COVID-19: ICD-10-CM

## 2022-05-10 PROCEDURE — U0003 INFECTIOUS AGENT DETECTION BY NUCLEIC ACID (DNA OR RNA); SEVERE ACUTE RESPIRATORY SYNDROME CORONAVIRUS 2 (SARS-COV-2) (CORONAVIRUS DISEASE [COVID-19]), AMPLIFIED PROBE TECHNIQUE, MAKING USE OF HIGH THROUGHPUT TECHNOLOGIES AS DESCRIBED BY CMS-2020-01-R: HCPCS | Mod: ORL | Performed by: NURSE PRACTITIONER

## 2022-05-11 LAB — SARS-COV-2 RNA RESP QL NAA+PROBE: NEGATIVE

## 2022-05-12 ENCOUNTER — LAB REQUISITION (OUTPATIENT)
Dept: LAB | Facility: CLINIC | Age: 87
End: 2022-05-12
Payer: MEDICARE

## 2022-05-12 DIAGNOSIS — U07.1 COVID-19: ICD-10-CM

## 2022-05-12 PROCEDURE — U0005 INFEC AGEN DETEC AMPLI PROBE: HCPCS | Mod: ORL | Performed by: INTERNAL MEDICINE

## 2022-05-13 LAB — SARS-COV-2 RNA RESP QL NAA+PROBE: NEGATIVE

## 2023-01-01 ENCOUNTER — LAB REQUISITION (OUTPATIENT)
Dept: LAB | Facility: CLINIC | Age: 88
End: 2023-01-01
Payer: MEDICARE

## 2023-01-01 DIAGNOSIS — M10.9 GOUT, UNSPECIFIED: ICD-10-CM

## 2023-01-01 DIAGNOSIS — B99.9 UNSPECIFIED INFECTIOUS DISEASE: ICD-10-CM

## 2023-01-01 DIAGNOSIS — D72.829 ELEVATED WHITE BLOOD CELL COUNT, UNSPECIFIED: ICD-10-CM

## 2023-01-01 LAB
ERYTHROCYTE [DISTWIDTH] IN BLOOD BY AUTOMATED COUNT: 14.2 % (ref 10–15)
HCT VFR BLD AUTO: 41.1 % (ref 35–47)
HGB BLD-MCNC: 13 G/DL (ref 11.7–15.7)
MCH RBC QN AUTO: 27.9 PG (ref 26.5–33)
MCHC RBC AUTO-ENTMCNC: 31.6 G/DL (ref 31.5–36.5)
MCV RBC AUTO: 88 FL (ref 78–100)
PLATELET # BLD AUTO: 226 10E3/UL (ref 150–450)
RBC # BLD AUTO: 4.66 10E6/UL (ref 3.8–5.2)
URATE SERPL-MCNC: 5.1 MG/DL (ref 2.4–5.7)
WBC # BLD AUTO: 12.7 10E3/UL (ref 4–11)
WBC # BLD AUTO: 13.5 10E3/UL (ref 4–11)

## 2023-01-01 PROCEDURE — P9604 ONE-WAY ALLOW PRORATED TRIP: HCPCS | Mod: ORL | Performed by: NURSE PRACTITIONER

## 2023-01-01 PROCEDURE — 36415 COLL VENOUS BLD VENIPUNCTURE: CPT | Mod: ORL | Performed by: NURSE PRACTITIONER

## 2023-01-01 PROCEDURE — P9603 ONE-WAY ALLOW PRORATED MILES: HCPCS | Mod: ORL | Performed by: NURSE PRACTITIONER

## 2023-01-01 PROCEDURE — 85027 COMPLETE CBC AUTOMATED: CPT | Mod: ORL | Performed by: NURSE PRACTITIONER

## 2023-01-01 PROCEDURE — 85048 AUTOMATED LEUKOCYTE COUNT: CPT | Mod: ORL | Performed by: NURSE PRACTITIONER

## 2023-01-01 PROCEDURE — 84550 ASSAY OF BLOOD/URIC ACID: CPT | Mod: ORL | Performed by: NURSE PRACTITIONER
